# Patient Record
Sex: FEMALE | Race: WHITE | HISPANIC OR LATINO | Employment: UNEMPLOYED | URBAN - METROPOLITAN AREA
[De-identification: names, ages, dates, MRNs, and addresses within clinical notes are randomized per-mention and may not be internally consistent; named-entity substitution may affect disease eponyms.]

---

## 2023-04-17 ENCOUNTER — HOSPITAL ENCOUNTER (OUTPATIENT)
Facility: HOSPITAL | Age: 28
Discharge: HOME | End: 2023-04-17
Attending: OBSTETRICS & GYNECOLOGY | Admitting: OBSTETRICS & GYNECOLOGY
Payer: MEDICAID

## 2023-04-17 VITALS
BODY MASS INDEX: 36.86 KG/M2 | WEIGHT: 208 LBS | HEIGHT: 63 IN | SYSTOLIC BLOOD PRESSURE: 129 MMHG | TEMPERATURE: 98.1 F | DIASTOLIC BLOOD PRESSURE: 86 MMHG | HEART RATE: 70 BPM | RESPIRATION RATE: 16 BRPM

## 2023-04-17 LAB
EXPIRATION DATE: NORMAL
Lab: NORMAL
PROT UR STRIP-MCNC: NEGATIVE MG/DL

## 2023-04-17 PROCEDURE — 59025 FETAL NON-STRESS TEST: CPT

## 2023-04-17 PROCEDURE — G0463 HOSPITAL OUTPT CLINIC VISIT: HCPCS

## 2023-04-17 PROCEDURE — 81002 URINALYSIS NONAUTO W/O SCOPE: CPT | Performed by: OBSTETRICS & GYNECOLOGY

## 2023-04-17 RX ORDER — PRENATAL WITH FERROUS FUM AND FOLIC ACID 3080; 920; 120; 400; 22; 1.84; 3; 20; 10; 1; 12; 200; 27; 25; 2 [IU]/1; [IU]/1; MG/1; [IU]/1; MG/1; MG/1; MG/1; MG/1; MG/1; MG/1; UG/1; MG/1; MG/1; MG/1; MG/1
1 TABLET ORAL DAILY
Status: ON HOLD | COMMUNITY

## 2023-04-17 NOTE — H&P
[facility]  Obstetric History and Physical    Chief Complaint   Patient presents with   • Edema       Subjective     Patient is a 27 y.o. female  currently at 40w1d, who presents with complaints of pedal edema.  Patient recently moved back to this area from [place].  She has [facility] with [facility]  Today, she denies headache, scotomata or epigastric pain.  Her main complaint is of edema and some reddish discoloration to her shins.  She denies calf pain.  She denies contractions, vaginal bleeding or leakage of fluid.    Her prenatal care is reportedly unremarkable. Her previous obstetric/gynecological history is notable for a first trimester pregnancy loss, a term vaginal delivery and a term  section.  She plans a trial of labor with this pregnancy..    The following portions of the patients history were reviewed and updated as appropriate: current medications, allergies, past medical history, past surgical history, past family history, past social history and problem list .        Prenatal Information:   Maternal Prenatal Labs  Blood Type No results found for: ABO   Rh Status No results found for: RH   Antibody Screen No results found for: ABSCRN   Gonnorhea No results found for: GCCX   Chlamydia No results found for: CLAMYDCU   RPR No results found for: RPR   Syphilis Antibody No results found for: SYPHILIS   Rubella No results found for: RUBELLAIGGIN   Hepatitis B Surface Antigen No results found for: HEPBSAG   HIV-1 Antibody No results found for: LABHIV1   Hepatitis C Antibody No results found for: HEPCAB   Rapid Urin Drug Screen No results found for: AMPMETHU, BARBITSCNUR, LABBENZSCN, LABMETHSCN, LABOPIASCN, THCURSCR, COCAINEUR, AMPHETSCREEN, PROPOXSCN, BUPRENORSCNU, METAMPSCNUR, OXYCODONESCN, TRICYCLICSCN   Group B Strep Culture No results found for: GBSANTIGEN                 Past OB History:       OB History    Para Term  AB Living   4 2 2 0  1 2   SAB IAB Ectopic Molar Multiple Live Births   1 0 0 0 0 2      # Outcome Date GA Lbr [place]/2nd Weight Sex Delivery Anes PTL Lv   4 Current            3 Term 17 39w0d   M CS-Unspec EPI  JUAN   2 Term 14 39w0d   M Vag-Spont EPI  JUAN   1 SAB                Past Medical History:  History reviewed. No pertinent past medical history.   Past Surgical History Past Surgical History:   Procedure Laterality Date   •  SECTION        Family History: History reviewed. No pertinent family history.   Social History:  reports that she quit smoking about 12 years ago. Her smoking use included cigarettes. She has never used smokeless tobacco.   reports that she does not currently use alcohol.   reports that she does not currently use drugs.   Allergies: Patient has no known allergies.  Current Medications:          No current facility-administered medications on file prior to encounter.     Current Outpatient Medications on File Prior to Encounter   Medication Sig Dispense Refill   • Prenatal Vit-Fe Fumarate-FA (Prenatal 27-) 27-1 MG tablet tablet Take 1 tablet by mouth Daily.         General ROS: Pertinent items are noted in HPI, all other systems reviewed and negative    Objective       Vital Signs Range for the last 24 hours  Temperature: Temp:  [98.1 °F (36.7 °C)] 98.1 °F (36.7 °C)   Temp Source: Temp src: Axillary   BP: BP: (129)/(86) 129/86   Pulse: Heart Rate:  [70] 70   Respirations: Resp:  [16] 16   SPO2:     O2 Amount (l/min):     O2 Devices     Weight: Weight:  [94.3 kg (208 lb)] 94.3 kg (208 lb)     Physical Examination: General appearance - alert, well appearing, and in no distress, oriented to person, place, and time and overweight  Mental status - alert, oriented to person, place, and time, normal mood, behavior, speech, dress, motor activity, and thought processes  Eyes - pupils equal and reactive, extraocular eye movements intact, sclera anicteric  Neck - supple, no significant adenopathy,  thyroid exam: thyroid is normal in size without nodules or tenderness  Chest - clear to auscultation, no wheezes, rales or rhonchi, symmetric air entry  Heart - normal rate, regular rhythm, normal S1, S2, no murmurs, rubs, clicks or gallops  Abdomen-soft, nontender, nondistended, no masses or organomegaly   Abdomen, Non-Tender  Neurological - alert, oriented, normal speech, no focal findings or movement disorder noted, DTR's normal and symmetric  Extremities - pedal edema 2-3+     Fetal Heart Rate Assessment  Indication: Edema in pregnancy   Start Time: 1136                end Time: 1206   NST Results: Reactive NST.  Fetal heart rate baseline 125-135 bpm.  Average variability with 15 x 15 accelerations noted.  No decelerations.  No regular uterine contraction pattern.        Laboratory Results:   No results found for: ALKPHOS, ALT, AST, CREATININE, BILITOT, LDH, URICACID    No results found for: WBC, RBC, HGB, HCT, MCV, MCH, MCHC, RDW, RDWSD, MPV, PLT, NEUTRORELPCT, LYMPHORELPCT, MONORELPCT, EOSRELPCT, BASORELPCT, AUTOIGPER, NEUTROABS, LYMPHSABS, MONOSABS, EOSABS, BASOSABS, AUTOIGNUM, NRBC          Brief Urine Lab Results  (Last result in the past 365 days)      Color   Clarity   Blood   Leuk Est   Nitrite   Protein   CREAT   Urine HCG        23 1203           Negative                   Radiology Review: No new studies  Other Studies: Urine protein is negative    Assessment & Plan       * No active hospital problems. *        Assessment:  1.  Edema in pregnancy.  No evidence of hypertension or proteinuria.  2.  History of  section.  Patient has had 1 prior vaginal delivery before her  section and plans for trial of labor with this pregnancy.    Plan:  1. Discharge home.  2. Discussed signs/symptoms of labor.  Reviewed instructions for fetal kick counts.  Reviewed signs/symptoms of severe preeclampsia.  3. Discussed relief measures with respect to her edema.  4. Keep regularly scheduled  "OB office visit.     Total time spent today with Angella  was 20-29 minutes (level 3).  Of this time, > 50% was spent face-to-face time coordinating care, answering her questions and counseling regarding pathophysiology of her presenting problem along with plans for any diagnostic work-up and treatment.        Nishant Mao \"Keisha\" JACKIE Bruce MD  4/17/2023  12:04 EDT    "

## 2023-04-22 ENCOUNTER — HOSPITAL ENCOUNTER (INPATIENT)
Facility: HOSPITAL | Age: 28
LOS: 2 days | Discharge: HOME | End: 2023-04-25
Attending: ADVANCED PRACTICE MIDWIFE | Admitting: OBSTETRICS & GYNECOLOGY
Payer: MEDICAID

## 2023-04-22 ENCOUNTER — HOSPITAL ENCOUNTER (OUTPATIENT)
Dept: LABOR AND DELIVERY | Facility: HOSPITAL | Age: 28
Discharge: HOME | End: 2023-04-22
Payer: MEDICAID

## 2023-04-22 DIAGNOSIS — Z98.891 STATUS POST REPEAT LOW TRANSVERSE CESAREAN SECTION: Primary | ICD-10-CM

## 2023-04-22 LAB
ABO GROUP BLD: NORMAL
ALP SERPL-CCNC: 149 U/L (ref 39–117)
ALT SERPL W P-5'-P-CCNC: 8 U/L (ref 1–33)
AST SERPL-CCNC: 18 U/L (ref 1–32)
BILIRUB SERPL-MCNC: 0.7 MG/DL (ref 0–1.2)
BLD GP AB SCN SERPL QL: NEGATIVE
CREAT SERPL-MCNC: 0.47 MG/DL (ref 0.57–1)
DEPRECATED RDW RBC AUTO: 44.5 FL (ref 37–54)
ERYTHROCYTE [DISTWIDTH] IN BLOOD BY AUTOMATED COUNT: 13.5 % (ref 12.3–15.4)
HCT VFR BLD AUTO: 38.1 % (ref 34–46.6)
HGB BLD-MCNC: 12.6 G/DL (ref 12–15.9)
LDH SERPL-CCNC: 261 U/L (ref 135–214)
MCH RBC QN AUTO: 30.1 PG (ref 26.6–33)
MCHC RBC AUTO-ENTMCNC: 33.1 G/DL (ref 31.5–35.7)
MCV RBC AUTO: 91.1 FL (ref 79–97)
PLATELET # BLD AUTO: 237 10*3/MM3 (ref 140–450)
PMV BLD AUTO: 9.8 FL (ref 6–12)
RBC # BLD AUTO: 4.18 10*6/MM3 (ref 3.77–5.28)
RH BLD: POSITIVE
T&S EXPIRATION DATE: NORMAL
URATE SERPL-MCNC: 4.5 MG/DL (ref 2.4–5.7)
WBC NRBC COR # BLD: 9.68 10*3/MM3 (ref 3.4–10.8)

## 2023-04-22 PROCEDURE — 83615 LACTATE (LD) (LDH) ENZYME: CPT | Performed by: ADVANCED PRACTICE MIDWIFE

## 2023-04-22 PROCEDURE — 84075 ASSAY ALKALINE PHOSPHATASE: CPT | Performed by: ADVANCED PRACTICE MIDWIFE

## 2023-04-22 PROCEDURE — 84450 TRANSFERASE (AST) (SGOT): CPT | Performed by: ADVANCED PRACTICE MIDWIFE

## 2023-04-22 PROCEDURE — 36415 COLL VENOUS BLD VENIPUNCTURE: CPT | Performed by: ADVANCED PRACTICE MIDWIFE

## 2023-04-22 PROCEDURE — 82565 ASSAY OF CREATININE: CPT | Performed by: ADVANCED PRACTICE MIDWIFE

## 2023-04-22 PROCEDURE — 86900 BLOOD TYPING SEROLOGIC ABO: CPT

## 2023-04-22 PROCEDURE — 82247 BILIRUBIN TOTAL: CPT | Performed by: ADVANCED PRACTICE MIDWIFE

## 2023-04-22 PROCEDURE — 86901 BLOOD TYPING SEROLOGIC RH(D): CPT

## 2023-04-22 PROCEDURE — 86850 RBC ANTIBODY SCREEN: CPT | Performed by: ADVANCED PRACTICE MIDWIFE

## 2023-04-22 PROCEDURE — 86901 BLOOD TYPING SEROLOGIC RH(D): CPT | Performed by: ADVANCED PRACTICE MIDWIFE

## 2023-04-22 PROCEDURE — 84460 ALANINE AMINO (ALT) (SGPT): CPT | Performed by: ADVANCED PRACTICE MIDWIFE

## 2023-04-22 PROCEDURE — 86900 BLOOD TYPING SEROLOGIC ABO: CPT | Performed by: ADVANCED PRACTICE MIDWIFE

## 2023-04-22 PROCEDURE — 85027 COMPLETE CBC AUTOMATED: CPT | Performed by: ADVANCED PRACTICE MIDWIFE

## 2023-04-22 PROCEDURE — 84550 ASSAY OF BLOOD/URIC ACID: CPT | Performed by: ADVANCED PRACTICE MIDWIFE

## 2023-04-22 RX ORDER — OXYTOCIN 10 [USP'U]/ML
10 INJECTION, SOLUTION INTRAMUSCULAR; INTRAVENOUS ONCE
Status: CANCELLED | OUTPATIENT
Start: 2023-04-22 | End: 2023-04-22

## 2023-04-22 RX ORDER — FAMOTIDINE 10 MG/ML
20 INJECTION, SOLUTION INTRAVENOUS EVERY 12 HOURS PRN
Status: DISCONTINUED | OUTPATIENT
Start: 2023-04-22 | End: 2023-04-23

## 2023-04-22 RX ORDER — SODIUM CHLORIDE, SODIUM LACTATE, POTASSIUM CHLORIDE, CALCIUM CHLORIDE 600; 310; 30; 20 MG/100ML; MG/100ML; MG/100ML; MG/100ML
125 INJECTION, SOLUTION INTRAVENOUS CONTINUOUS
Status: DISCONTINUED | OUTPATIENT
Start: 2023-04-22 | End: 2023-04-23

## 2023-04-22 RX ORDER — DIPHENHYDRAMINE HYDROCHLORIDE 50 MG/ML
25 INJECTION INTRAMUSCULAR; INTRAVENOUS NIGHTLY PRN
Status: DISCONTINUED | OUTPATIENT
Start: 2023-04-22 | End: 2023-04-23

## 2023-04-22 RX ORDER — SODIUM CHLORIDE 0.9 % (FLUSH) 0.9 %
10 SYRINGE (ML) INJECTION EVERY 12 HOURS SCHEDULED
Status: CANCELLED | OUTPATIENT
Start: 2023-04-22

## 2023-04-22 RX ORDER — METHYLERGONOVINE MALEATE 0.2 MG/ML
200 INJECTION INTRAVENOUS ONCE AS NEEDED
Status: CANCELLED | OUTPATIENT
Start: 2023-04-22

## 2023-04-22 RX ORDER — DIPHENHYDRAMINE HCL 25 MG
25 CAPSULE ORAL NIGHTLY PRN
Status: DISCONTINUED | OUTPATIENT
Start: 2023-04-22 | End: 2023-04-23

## 2023-04-22 RX ORDER — SODIUM CHLORIDE 0.9 % (FLUSH) 0.9 %
10 SYRINGE (ML) INJECTION AS NEEDED
Status: DISCONTINUED | OUTPATIENT
Start: 2023-04-22 | End: 2023-04-23

## 2023-04-22 RX ORDER — BUTORPHANOL TARTRATE 1 MG/ML
2 INJECTION, SOLUTION INTRAMUSCULAR; INTRAVENOUS
Status: DISCONTINUED | OUTPATIENT
Start: 2023-04-22 | End: 2023-04-23

## 2023-04-22 RX ORDER — BUTORPHANOL TARTRATE 1 MG/ML
1 INJECTION, SOLUTION INTRAMUSCULAR; INTRAVENOUS
Status: CANCELLED | OUTPATIENT
Start: 2023-04-22

## 2023-04-22 RX ORDER — DIPHENHYDRAMINE HYDROCHLORIDE 50 MG/ML
25 INJECTION INTRAMUSCULAR; INTRAVENOUS NIGHTLY PRN
Status: CANCELLED | OUTPATIENT
Start: 2023-04-22

## 2023-04-22 RX ORDER — FAMOTIDINE 20 MG/1
20 TABLET, FILM COATED ORAL EVERY 12 HOURS PRN
Status: DISCONTINUED | OUTPATIENT
Start: 2023-04-22 | End: 2023-04-23

## 2023-04-22 RX ORDER — BUTORPHANOL TARTRATE 1 MG/ML
1 INJECTION, SOLUTION INTRAMUSCULAR; INTRAVENOUS
Status: DISCONTINUED | OUTPATIENT
Start: 2023-04-22 | End: 2023-04-23

## 2023-04-22 RX ORDER — TERBUTALINE SULFATE 1 MG/ML
0.25 INJECTION, SOLUTION SUBCUTANEOUS AS NEEDED
Status: CANCELLED | OUTPATIENT
Start: 2023-04-22

## 2023-04-22 RX ORDER — MAGNESIUM CARB/ALUMINUM HYDROX 105-160MG
30 TABLET,CHEWABLE ORAL ONCE
Status: CANCELLED | OUTPATIENT
Start: 2023-04-22 | End: 2023-04-22

## 2023-04-22 RX ORDER — OXYTOCIN/0.9 % SODIUM CHLORIDE 30/500 ML
2-20 PLASTIC BAG, INJECTION (ML) INTRAVENOUS
Status: CANCELLED | OUTPATIENT
Start: 2023-04-23

## 2023-04-22 RX ORDER — ACETAMINOPHEN 325 MG/1
650 TABLET ORAL EVERY 4 HOURS PRN
Status: DISCONTINUED | OUTPATIENT
Start: 2023-04-22 | End: 2023-04-23 | Stop reason: HOSPADM

## 2023-04-22 RX ORDER — SODIUM CHLORIDE 0.9 % (FLUSH) 0.9 %
10 SYRINGE (ML) INJECTION EVERY 12 HOURS SCHEDULED
Status: DISCONTINUED | OUTPATIENT
Start: 2023-04-22 | End: 2023-04-23

## 2023-04-22 RX ORDER — ONDANSETRON 4 MG/1
4 TABLET, FILM COATED ORAL EVERY 6 HOURS PRN
Status: DISCONTINUED | OUTPATIENT
Start: 2023-04-22 | End: 2023-04-23 | Stop reason: HOSPADM

## 2023-04-22 RX ORDER — SODIUM CHLORIDE 0.9 % (FLUSH) 0.9 %
10 SYRINGE (ML) INJECTION AS NEEDED
Status: CANCELLED | OUTPATIENT
Start: 2023-04-22

## 2023-04-22 RX ORDER — OXYTOCIN/0.9 % SODIUM CHLORIDE 30/500 ML
2-20 PLASTIC BAG, INJECTION (ML) INTRAVENOUS
Status: DISCONTINUED | OUTPATIENT
Start: 2023-04-23 | End: 2023-04-23

## 2023-04-22 RX ORDER — CARBOPROST TROMETHAMINE 250 UG/ML
250 INJECTION, SOLUTION INTRAMUSCULAR AS NEEDED
Status: CANCELLED | OUTPATIENT
Start: 2023-04-22

## 2023-04-22 RX ORDER — LIDOCAINE HYDROCHLORIDE 10 MG/ML
5 INJECTION, SOLUTION EPIDURAL; INFILTRATION; INTRACAUDAL; PERINEURAL AS NEEDED
Status: DISCONTINUED | OUTPATIENT
Start: 2023-04-22 | End: 2023-04-23

## 2023-04-22 RX ORDER — ONDANSETRON 4 MG/1
4 TABLET, FILM COATED ORAL EVERY 6 HOURS PRN
Status: CANCELLED | OUTPATIENT
Start: 2023-04-22

## 2023-04-22 RX ORDER — MISOPROSTOL 200 UG/1
800 TABLET ORAL AS NEEDED
Status: CANCELLED | OUTPATIENT
Start: 2023-04-22

## 2023-04-22 RX ORDER — TERBUTALINE SULFATE 1 MG/ML
0.25 INJECTION, SOLUTION SUBCUTANEOUS AS NEEDED
Status: DISCONTINUED | OUTPATIENT
Start: 2023-04-22 | End: 2023-04-23

## 2023-04-22 RX ORDER — LIDOCAINE HYDROCHLORIDE 10 MG/ML
5 INJECTION, SOLUTION EPIDURAL; INFILTRATION; INTRACAUDAL; PERINEURAL AS NEEDED
Status: CANCELLED | OUTPATIENT
Start: 2023-04-22

## 2023-04-22 RX ORDER — DIPHENHYDRAMINE HCL 25 MG
25 CAPSULE ORAL NIGHTLY PRN
Status: CANCELLED | OUTPATIENT
Start: 2023-04-22

## 2023-04-22 RX ORDER — IBUPROFEN 600 MG/1
600 TABLET ORAL EVERY 6 HOURS PRN
Status: CANCELLED | OUTPATIENT
Start: 2023-04-22

## 2023-04-22 RX ORDER — SODIUM CHLORIDE, SODIUM LACTATE, POTASSIUM CHLORIDE, CALCIUM CHLORIDE 600; 310; 30; 20 MG/100ML; MG/100ML; MG/100ML; MG/100ML
125 INJECTION, SOLUTION INTRAVENOUS CONTINUOUS
Status: CANCELLED | OUTPATIENT
Start: 2023-04-22

## 2023-04-22 RX ORDER — ACETAMINOPHEN 325 MG/1
650 TABLET ORAL EVERY 4 HOURS PRN
Status: CANCELLED | OUTPATIENT
Start: 2023-04-22

## 2023-04-22 RX ORDER — ONDANSETRON 2 MG/ML
4 INJECTION INTRAMUSCULAR; INTRAVENOUS EVERY 6 HOURS PRN
Status: CANCELLED | OUTPATIENT
Start: 2023-04-22

## 2023-04-22 RX ORDER — FAMOTIDINE 20 MG/1
20 TABLET, FILM COATED ORAL EVERY 12 HOURS PRN
Status: CANCELLED | OUTPATIENT
Start: 2023-04-22

## 2023-04-22 RX ORDER — ONDANSETRON 2 MG/ML
4 INJECTION INTRAMUSCULAR; INTRAVENOUS EVERY 6 HOURS PRN
Status: DISCONTINUED | OUTPATIENT
Start: 2023-04-22 | End: 2023-04-23 | Stop reason: HOSPADM

## 2023-04-22 RX ORDER — MAGNESIUM CARB/ALUMINUM HYDROX 105-160MG
30 TABLET,CHEWABLE ORAL ONCE
Status: DISCONTINUED | OUTPATIENT
Start: 2023-04-22 | End: 2023-04-23

## 2023-04-23 ENCOUNTER — ANESTHESIA EVENT (OUTPATIENT)
Dept: LABOR AND DELIVERY | Facility: HOSPITAL | Age: 28
End: 2023-04-23
Payer: MEDICAID

## 2023-04-23 ENCOUNTER — ANESTHESIA (OUTPATIENT)
Dept: LABOR AND DELIVERY | Facility: HOSPITAL | Age: 28
End: 2023-04-23
Payer: MEDICAID

## 2023-04-23 PROBLEM — Z3A.41 POST TERM PREGNANCY AT 41 WEEKS GESTATION: Status: ACTIVE | Noted: 2023-04-23

## 2023-04-23 PROBLEM — O48.0 POST TERM PREGNANCY AT 41 WEEKS GESTATION: Status: ACTIVE | Noted: 2023-04-23

## 2023-04-23 LAB
ABO GROUP BLD: NORMAL
ATMOSPHERIC PRESS: ABNORMAL MM[HG]
ATMOSPHERIC PRESS: ABNORMAL MM[HG]
BASE EXCESS BLDCOA CALC-SCNC: -2.8 MMOL/L (ref 0–2)
BASE EXCESS BLDCOV CALC-SCNC: -1.4 MMOL/L (ref 0–2)
BDY SITE: ABNORMAL
BDY SITE: ABNORMAL
BODY TEMPERATURE: 37 C
BODY TEMPERATURE: 37 C
CO2 BLDA-SCNC: 25.8 MMOL/L (ref 22–33)
CO2 BLDA-SCNC: 26.7 MMOL/L (ref 22–33)
COLLECT TME SMN: ABNORMAL
EPAP: 0
EPAP: 0
HCO3 BLDCOA-SCNC: 25 MMOL/L (ref 16.9–20.5)
HCO3 BLDCOV-SCNC: 24.5 MMOL/L (ref 18.6–21.4)
HCT VFR BLD AUTO: 39.1 % (ref 34–46.6)
HGB BLD-MCNC: 13.3 G/DL (ref 12–15.9)
HGB BLDA-MCNC: 17.4 G/DL (ref 14–18)
HGB BLDA-MCNC: 17.8 G/DL (ref 14–18)
INHALED O2 CONCENTRATION: 21 %
INHALED O2 CONCENTRATION: 21 %
IPAP: 0
IPAP: 0
MODALITY: ABNORMAL
MODALITY: ABNORMAL
NOTE: ABNORMAL
NOTE: ABNORMAL
PAW @ PEAK INSP FLOW SETTING VENT: 0 CMH2O
PAW @ PEAK INSP FLOW SETTING VENT: 0 CMH2O
PCO2 BLDCOA: 53.3 MMHG (ref 43.3–54.9)
PCO2 BLDCOV: 43.9 MM HG (ref 28–40)
PH BLDCOA: 7.28 PH UNITS (ref 7.22–7.3)
PH BLDCOV: 7.36 PH UNITS (ref 7.31–7.37)
PO2 BLDCOA: 15.1 MMHG (ref 11.5–43.3)
PO2 BLDCOV: 21.7 MM HG (ref 21–31)
RH BLD: POSITIVE
SAO2 % BLDCOA: 23.3 %
SAO2 % BLDCOA: ABNORMAL %
SAO2 % BLDCOV: 47.5 %
TOTAL RATE: 0 BREATHS/MINUTE
TOTAL RATE: 0 BREATHS/MINUTE
VENTILATOR MODE: ABNORMAL

## 2023-04-23 PROCEDURE — 25010000002 CHLOROPROCAINE HCL (PF) 3 % SOLUTION: Performed by: ANESTHESIOLOGY

## 2023-04-23 PROCEDURE — 85018 HEMOGLOBIN: CPT | Performed by: OBSTETRICS & GYNECOLOGY

## 2023-04-23 PROCEDURE — 25010000002 BUTORPHANOL PER 1 MG: Performed by: ADVANCED PRACTICE MIDWIFE

## 2023-04-23 PROCEDURE — 25010000002 METHYLERGONOVINE MALEATE PER 0.2 MG: Performed by: ANESTHESIOLOGY

## 2023-04-23 PROCEDURE — 25010000002 FUROSEMIDE PER 20 MG: Performed by: OBSTETRICS & GYNECOLOGY

## 2023-04-23 PROCEDURE — 25010000002 ONDANSETRON PER 1 MG: Performed by: OBSTETRICS & GYNECOLOGY

## 2023-04-23 PROCEDURE — 10907ZC DRAINAGE OF AMNIOTIC FLUID, THERAPEUTIC FROM PRODUCTS OF CONCEPTION, VIA NATURAL OR ARTIFICIAL OPENING: ICD-10-PCS | Performed by: OBSTETRICS & GYNECOLOGY

## 2023-04-23 PROCEDURE — 25010000002 OXYTOCIN PER 10 UNITS: Performed by: ANESTHESIOLOGY

## 2023-04-23 PROCEDURE — 82805 BLOOD GASES W/O2 SATURATION: CPT

## 2023-04-23 PROCEDURE — 85014 HEMATOCRIT: CPT | Performed by: OBSTETRICS & GYNECOLOGY

## 2023-04-23 PROCEDURE — 25010000002 ROPIVACAINE PER 1 MG: Performed by: ANESTHESIOLOGY

## 2023-04-23 PROCEDURE — 59025 FETAL NON-STRESS TEST: CPT

## 2023-04-23 PROCEDURE — 3E033VJ INTRODUCTION OF OTHER HORMONE INTO PERIPHERAL VEIN, PERCUTANEOUS APPROACH: ICD-10-PCS | Performed by: OBSTETRICS & GYNECOLOGY

## 2023-04-23 PROCEDURE — 25010000002 MIDAZOLAM PER 1 MG: Performed by: ANESTHESIOLOGY

## 2023-04-23 PROCEDURE — C1755 CATHETER, INTRASPINAL: HCPCS | Performed by: ANESTHESIOLOGY

## 2023-04-23 PROCEDURE — C1755 CATHETER, INTRASPINAL: HCPCS

## 2023-04-23 PROCEDURE — 25010000002 METOCLOPRAMIDE PER 10 MG: Performed by: ANESTHESIOLOGY

## 2023-04-23 PROCEDURE — 25010000002 AZITHROMYCIN PER 500 MG: Performed by: OBSTETRICS & GYNECOLOGY

## 2023-04-23 PROCEDURE — 51703 INSERT BLADDER CATH COMPLEX: CPT

## 2023-04-23 PROCEDURE — 25010000002 ONDANSETRON PER 1 MG: Performed by: ANESTHESIOLOGY

## 2023-04-23 PROCEDURE — 25010000002 KETOROLAC TROMETHAMINE PER 15 MG: Performed by: OBSTETRICS & GYNECOLOGY

## 2023-04-23 PROCEDURE — 0 MORPHINE PER 10 MG: Performed by: ANESTHESIOLOGY

## 2023-04-23 PROCEDURE — 25010000002 FENTANYL CITRATE (PF) 50 MCG/ML SOLUTION: Performed by: ANESTHESIOLOGY

## 2023-04-23 RX ORDER — CEFAZOLIN SODIUM 2 G/100ML
2 INJECTION, SOLUTION INTRAVENOUS ONCE
Status: DISCONTINUED | OUTPATIENT
Start: 2023-04-23 | End: 2023-04-25 | Stop reason: HOSPADM

## 2023-04-23 RX ORDER — KETOROLAC TROMETHAMINE 15 MG/ML
15 INJECTION, SOLUTION INTRAMUSCULAR; INTRAVENOUS EVERY 6 HOURS
Status: COMPLETED | OUTPATIENT
Start: 2023-04-24 | End: 2023-04-24

## 2023-04-23 RX ORDER — ROPIVACAINE HYDROCHLORIDE 5 MG/ML
INJECTION, SOLUTION EPIDURAL; INFILTRATION; PERINEURAL AS NEEDED
Status: DISCONTINUED | OUTPATIENT
Start: 2023-04-23 | End: 2023-04-23 | Stop reason: SURG

## 2023-04-23 RX ORDER — MISOPROSTOL 200 UG/1
800 TABLET ORAL AS NEEDED
Status: DISCONTINUED | OUTPATIENT
Start: 2023-04-23 | End: 2023-04-23 | Stop reason: HOSPADM

## 2023-04-23 RX ORDER — ALUMINA, MAGNESIA, AND SIMETHICONE 2400; 2400; 240 MG/30ML; MG/30ML; MG/30ML
15 SUSPENSION ORAL EVERY 4 HOURS PRN
Status: DISCONTINUED | OUTPATIENT
Start: 2023-04-23 | End: 2023-04-25 | Stop reason: HOSPADM

## 2023-04-23 RX ORDER — ONDANSETRON 2 MG/ML
4 INJECTION INTRAMUSCULAR; INTRAVENOUS ONCE AS NEEDED
Status: DISCONTINUED | OUTPATIENT
Start: 2023-04-23 | End: 2023-04-23

## 2023-04-23 RX ORDER — METOCLOPRAMIDE HYDROCHLORIDE 5 MG/ML
INJECTION INTRAMUSCULAR; INTRAVENOUS AS NEEDED
Status: DISCONTINUED | OUTPATIENT
Start: 2023-04-23 | End: 2023-04-23 | Stop reason: SURG

## 2023-04-23 RX ORDER — DIPHENHYDRAMINE HYDROCHLORIDE 50 MG/ML
25 INJECTION INTRAMUSCULAR; INTRAVENOUS ONCE
Status: DISCONTINUED | OUTPATIENT
Start: 2023-04-23 | End: 2023-04-23

## 2023-04-23 RX ORDER — DIPHENHYDRAMINE HCL 25 MG
25 CAPSULE ORAL EVERY 4 HOURS PRN
Status: DISCONTINUED | OUTPATIENT
Start: 2023-04-23 | End: 2023-04-25 | Stop reason: HOSPADM

## 2023-04-23 RX ORDER — ONDANSETRON 2 MG/ML
4 INJECTION INTRAMUSCULAR; INTRAVENOUS ONCE AS NEEDED
Status: ACTIVE | OUTPATIENT
Start: 2023-04-23 | End: 2023-04-24

## 2023-04-23 RX ORDER — PROMETHAZINE HYDROCHLORIDE 12.5 MG/1
12.5 TABLET ORAL EVERY 4 HOURS PRN
Status: DISCONTINUED | OUTPATIENT
Start: 2023-04-23 | End: 2023-04-23 | Stop reason: SDUPTHER

## 2023-04-23 RX ORDER — OXYCODONE HYDROCHLORIDE 5 MG/1
5 TABLET ORAL EVERY 4 HOURS PRN
Status: DISCONTINUED | OUTPATIENT
Start: 2023-04-23 | End: 2023-04-25 | Stop reason: HOSPADM

## 2023-04-23 RX ORDER — FAMOTIDINE 10 MG/ML
20 INJECTION, SOLUTION INTRAVENOUS ONCE AS NEEDED
Status: DISCONTINUED | OUTPATIENT
Start: 2023-04-23 | End: 2023-04-23 | Stop reason: HOSPADM

## 2023-04-23 RX ORDER — ROPIVACAINE HYDROCHLORIDE 2 MG/ML
15 INJECTION, SOLUTION EPIDURAL; INFILTRATION; PERINEURAL CONTINUOUS
Status: DISCONTINUED | OUTPATIENT
Start: 2023-04-23 | End: 2023-04-23

## 2023-04-23 RX ORDER — MIDAZOLAM HYDROCHLORIDE 1 MG/ML
INJECTION INTRAMUSCULAR; INTRAVENOUS AS NEEDED
Status: DISCONTINUED | OUTPATIENT
Start: 2023-04-23 | End: 2023-04-23 | Stop reason: SURG

## 2023-04-23 RX ORDER — KETOROLAC TROMETHAMINE 30 MG/ML
30 INJECTION, SOLUTION INTRAMUSCULAR; INTRAVENOUS ONCE
Status: CANCELLED | OUTPATIENT
Start: 2023-04-23 | End: 2023-04-28

## 2023-04-23 RX ORDER — MISOPROSTOL 200 UG/1
800 TABLET ORAL AS NEEDED
Status: DISCONTINUED | OUTPATIENT
Start: 2023-04-23 | End: 2023-04-25 | Stop reason: HOSPADM

## 2023-04-23 RX ORDER — IBUPROFEN 600 MG/1
600 TABLET ORAL EVERY 6 HOURS PRN
Status: DISCONTINUED | OUTPATIENT
Start: 2023-04-23 | End: 2023-04-23 | Stop reason: HOSPADM

## 2023-04-23 RX ORDER — DIPHENHYDRAMINE HYDROCHLORIDE 50 MG/ML
25 INJECTION INTRAMUSCULAR; INTRAVENOUS ONCE AS NEEDED
Status: DISCONTINUED | OUTPATIENT
Start: 2023-04-23 | End: 2023-04-25 | Stop reason: HOSPADM

## 2023-04-23 RX ORDER — METHYLERGONOVINE MALEATE 0.2 MG/ML
INJECTION INTRAVENOUS AS NEEDED
Status: DISCONTINUED | OUTPATIENT
Start: 2023-04-23 | End: 2023-04-23 | Stop reason: SURG

## 2023-04-23 RX ORDER — OXYTOCIN 10 [USP'U]/ML
INJECTION, SOLUTION INTRAMUSCULAR; INTRAVENOUS AS NEEDED
Status: DISCONTINUED | OUTPATIENT
Start: 2023-04-23 | End: 2023-04-23 | Stop reason: SURG

## 2023-04-23 RX ORDER — DIPHENHYDRAMINE HYDROCHLORIDE 50 MG/ML
25 INJECTION INTRAMUSCULAR; INTRAVENOUS EVERY 4 HOURS PRN
Status: DISCONTINUED | OUTPATIENT
Start: 2023-04-23 | End: 2023-04-25 | Stop reason: HOSPADM

## 2023-04-23 RX ORDER — HYDROXYZINE HYDROCHLORIDE 25 MG/1
50 TABLET, FILM COATED ORAL EVERY 6 HOURS PRN
Status: DISCONTINUED | OUTPATIENT
Start: 2023-04-23 | End: 2023-04-25 | Stop reason: HOSPADM

## 2023-04-23 RX ORDER — HYDROCORTISONE 25 MG/G
1 CREAM TOPICAL AS NEEDED
Status: DISCONTINUED | OUTPATIENT
Start: 2023-04-23 | End: 2023-04-25 | Stop reason: HOSPADM

## 2023-04-23 RX ORDER — IBUPROFEN 600 MG/1
600 TABLET ORAL EVERY 6 HOURS
Status: DISCONTINUED | OUTPATIENT
Start: 2023-04-25 | End: 2023-04-25 | Stop reason: HOSPADM

## 2023-04-23 RX ORDER — FUROSEMIDE 10 MG/ML
40 INJECTION INTRAMUSCULAR; INTRAVENOUS ONCE
Status: COMPLETED | OUTPATIENT
Start: 2023-04-23 | End: 2023-04-23

## 2023-04-23 RX ORDER — OXYCODONE HYDROCHLORIDE 5 MG/1
10 TABLET ORAL EVERY 4 HOURS PRN
Status: DISCONTINUED | OUTPATIENT
Start: 2023-04-23 | End: 2023-04-25 | Stop reason: HOSPADM

## 2023-04-23 RX ORDER — PROMETHAZINE HYDROCHLORIDE 12.5 MG/1
12.5 SUPPOSITORY RECTAL EVERY 6 HOURS PRN
Status: DISCONTINUED | OUTPATIENT
Start: 2023-04-23 | End: 2023-04-25 | Stop reason: HOSPADM

## 2023-04-23 RX ORDER — TRISODIUM CITRATE DIHYDRATE AND CITRIC ACID MONOHYDRATE 500; 334 MG/5ML; MG/5ML
30 SOLUTION ORAL ONCE
Status: DISCONTINUED | OUTPATIENT
Start: 2023-04-23 | End: 2023-04-23 | Stop reason: HOSPADM

## 2023-04-23 RX ORDER — MORPHINE SULFATE 0.5 MG/ML
INJECTION, SOLUTION EPIDURAL; INTRATHECAL; INTRAVENOUS AS NEEDED
Status: DISCONTINUED | OUTPATIENT
Start: 2023-04-23 | End: 2023-04-23 | Stop reason: SURG

## 2023-04-23 RX ORDER — METOCLOPRAMIDE HYDROCHLORIDE 5 MG/ML
10 INJECTION INTRAMUSCULAR; INTRAVENOUS ONCE AS NEEDED
Status: DISCONTINUED | OUTPATIENT
Start: 2023-04-23 | End: 2023-04-23

## 2023-04-23 RX ORDER — DIPHENHYDRAMINE HYDROCHLORIDE 50 MG/ML
12.5 INJECTION INTRAMUSCULAR; INTRAVENOUS EVERY 8 HOURS PRN
Status: DISCONTINUED | OUTPATIENT
Start: 2023-04-23 | End: 2023-04-23

## 2023-04-23 RX ORDER — OXYTOCIN 10 [USP'U]/ML
10 INJECTION, SOLUTION INTRAMUSCULAR; INTRAVENOUS ONCE
Status: DISCONTINUED | OUTPATIENT
Start: 2023-04-23 | End: 2023-04-23

## 2023-04-23 RX ORDER — METHYLERGONOVINE MALEATE 0.2 MG/ML
200 INJECTION INTRAVENOUS AS NEEDED
Status: DISCONTINUED | OUTPATIENT
Start: 2023-04-23 | End: 2023-04-25 | Stop reason: HOSPADM

## 2023-04-23 RX ORDER — PRENATAL VIT/IRON FUM/FOLIC AC 27MG-0.8MG
1 TABLET ORAL DAILY
Status: DISCONTINUED | OUTPATIENT
Start: 2023-04-24 | End: 2023-04-25 | Stop reason: HOSPADM

## 2023-04-23 RX ORDER — SODIUM CHLORIDE 9 MG/ML
40 INJECTION, SOLUTION INTRAVENOUS AS NEEDED
Status: DISCONTINUED | OUTPATIENT
Start: 2023-04-23 | End: 2023-04-25 | Stop reason: HOSPADM

## 2023-04-23 RX ORDER — PROMETHAZINE HYDROCHLORIDE 25 MG/1
25 TABLET ORAL EVERY 6 HOURS PRN
Status: DISCONTINUED | OUTPATIENT
Start: 2023-04-23 | End: 2023-04-25 | Stop reason: HOSPADM

## 2023-04-23 RX ORDER — LIDOCAINE HYDROCHLORIDE AND EPINEPHRINE 15; 5 MG/ML; UG/ML
INJECTION, SOLUTION EPIDURAL AS NEEDED
Status: DISCONTINUED | OUTPATIENT
Start: 2023-04-23 | End: 2023-04-23 | Stop reason: SURG

## 2023-04-23 RX ORDER — CARBOPROST TROMETHAMINE 250 UG/ML
250 INJECTION, SOLUTION INTRAMUSCULAR AS NEEDED
Status: DISCONTINUED | OUTPATIENT
Start: 2023-04-23 | End: 2023-04-25 | Stop reason: HOSPADM

## 2023-04-23 RX ORDER — NALOXONE HCL 0.4 MG/ML
0.4 VIAL (ML) INJECTION
Status: ACTIVE | OUTPATIENT
Start: 2023-04-23 | End: 2023-04-24

## 2023-04-23 RX ORDER — METHYLERGONOVINE MALEATE 0.2 MG/ML
200 INJECTION INTRAVENOUS ONCE AS NEEDED
Status: DISCONTINUED | OUTPATIENT
Start: 2023-04-23 | End: 2023-04-23 | Stop reason: HOSPADM

## 2023-04-23 RX ORDER — ONDANSETRON 2 MG/ML
INJECTION INTRAMUSCULAR; INTRAVENOUS AS NEEDED
Status: DISCONTINUED | OUTPATIENT
Start: 2023-04-23 | End: 2023-04-23 | Stop reason: SURG

## 2023-04-23 RX ORDER — DOCUSATE SODIUM 100 MG/1
100 CAPSULE, LIQUID FILLED ORAL 2 TIMES DAILY PRN
Status: DISCONTINUED | OUTPATIENT
Start: 2023-04-23 | End: 2023-04-25 | Stop reason: HOSPADM

## 2023-04-23 RX ORDER — SODIUM CHLORIDE 0.9 % (FLUSH) 0.9 %
3 SYRINGE (ML) INJECTION EVERY 12 HOURS SCHEDULED
Status: DISCONTINUED | OUTPATIENT
Start: 2023-04-23 | End: 2023-04-25 | Stop reason: HOSPADM

## 2023-04-23 RX ORDER — OXYTOCIN/0.9 % SODIUM CHLORIDE 30/500 ML
PLASTIC BAG, INJECTION (ML) INTRAVENOUS AS NEEDED
Status: DISCONTINUED | OUTPATIENT
Start: 2023-04-23 | End: 2023-04-23 | Stop reason: SURG

## 2023-04-23 RX ORDER — CEFAZOLIN SODIUM 2 G/100ML
INJECTION, SOLUTION INTRAVENOUS
Status: DISCONTINUED
Start: 2023-04-23 | End: 2023-04-23 | Stop reason: WASHOUT

## 2023-04-23 RX ORDER — CHLOROPROCAINE HYDROCHLORIDE 30 MG/ML
INJECTION, SOLUTION EPIDURAL; INFILTRATION; INTRACAUDAL; PERINEURAL AS NEEDED
Status: DISCONTINUED | OUTPATIENT
Start: 2023-04-23 | End: 2023-04-23 | Stop reason: SURG

## 2023-04-23 RX ORDER — CARBOPROST TROMETHAMINE 250 UG/ML
250 INJECTION, SOLUTION INTRAMUSCULAR AS NEEDED
Status: DISCONTINUED | OUTPATIENT
Start: 2023-04-23 | End: 2023-04-23 | Stop reason: HOSPADM

## 2023-04-23 RX ORDER — EPHEDRINE SULFATE 5 MG/ML
10 INJECTION INTRAVENOUS
Status: DISCONTINUED | OUTPATIENT
Start: 2023-04-23 | End: 2023-04-23

## 2023-04-23 RX ORDER — OXYTOCIN/0.9 % SODIUM CHLORIDE 30/500 ML
650 PLASTIC BAG, INJECTION (ML) INTRAVENOUS ONCE
Status: CANCELLED | OUTPATIENT
Start: 2023-04-23 | End: 2023-04-23

## 2023-04-23 RX ORDER — LIDOCAINE HYDROCHLORIDE AND EPINEPHRINE 20; 5 MG/ML; UG/ML
INJECTION, SOLUTION EPIDURAL; INFILTRATION; INTRACAUDAL; PERINEURAL AS NEEDED
Status: DISCONTINUED | OUTPATIENT
Start: 2023-04-23 | End: 2023-04-23 | Stop reason: SURG

## 2023-04-23 RX ORDER — ONDANSETRON 2 MG/ML
4 INJECTION INTRAMUSCULAR; INTRAVENOUS EVERY 6 HOURS PRN
Status: DISCONTINUED | OUTPATIENT
Start: 2023-04-23 | End: 2023-04-25 | Stop reason: HOSPADM

## 2023-04-23 RX ORDER — ACETAMINOPHEN 325 MG/1
650 TABLET ORAL EVERY 6 HOURS
Status: DISCONTINUED | OUTPATIENT
Start: 2023-04-24 | End: 2023-04-25 | Stop reason: HOSPADM

## 2023-04-23 RX ORDER — CALCIUM CARBONATE 200(500)MG
1 TABLET,CHEWABLE ORAL EVERY 4 HOURS PRN
Status: DISCONTINUED | OUTPATIENT
Start: 2023-04-23 | End: 2023-04-25 | Stop reason: HOSPADM

## 2023-04-23 RX ORDER — FENTANYL CITRATE 50 UG/ML
INJECTION, SOLUTION INTRAMUSCULAR; INTRAVENOUS AS NEEDED
Status: DISCONTINUED | OUTPATIENT
Start: 2023-04-23 | End: 2023-04-23 | Stop reason: SURG

## 2023-04-23 RX ORDER — ONDANSETRON 4 MG/1
4 TABLET, FILM COATED ORAL EVERY 6 HOURS PRN
Status: DISCONTINUED | OUTPATIENT
Start: 2023-04-23 | End: 2023-04-25 | Stop reason: HOSPADM

## 2023-04-23 RX ORDER — SODIUM CHLORIDE 0.9 % (FLUSH) 0.9 %
3-10 SYRINGE (ML) INJECTION AS NEEDED
Status: DISCONTINUED | OUTPATIENT
Start: 2023-04-23 | End: 2023-04-25 | Stop reason: HOSPADM

## 2023-04-23 RX ORDER — SIMETHICONE 80 MG
80 TABLET,CHEWABLE ORAL 4 TIMES DAILY PRN
Status: DISCONTINUED | OUTPATIENT
Start: 2023-04-23 | End: 2023-04-25 | Stop reason: HOSPADM

## 2023-04-23 RX ORDER — KETOROLAC TROMETHAMINE 30 MG/ML
30 INJECTION, SOLUTION INTRAMUSCULAR; INTRAVENOUS ONCE
Status: COMPLETED | OUTPATIENT
Start: 2023-04-23 | End: 2023-04-23

## 2023-04-23 RX ORDER — ACETAMINOPHEN 500 MG
1000 TABLET ORAL EVERY 6 HOURS
Status: COMPLETED | OUTPATIENT
Start: 2023-04-23 | End: 2023-04-24

## 2023-04-23 RX ADMIN — OXYTOCIN 3 UNITS: 10 INJECTION, SOLUTION INTRAMUSCULAR; INTRAVENOUS at 17:22

## 2023-04-23 RX ADMIN — Medication 20 MILLI-UNITS/MIN: at 17:50

## 2023-04-23 RX ADMIN — METHYLERGONOVINE MALEATE 200 MCG: 0.2 INJECTION INTRAVENOUS at 17:21

## 2023-04-23 RX ADMIN — ONDANSETRON 4 MG: 2 INJECTION INTRAMUSCULAR; INTRAVENOUS at 17:18

## 2023-04-23 RX ADMIN — MIDAZOLAM 1 MG: 1 INJECTION INTRAMUSCULAR; INTRAVENOUS at 17:23

## 2023-04-23 RX ADMIN — AZITHROMYCIN 500 MG: 500 INJECTION, POWDER, LYOPHILIZED, FOR SOLUTION INTRAVENOUS at 18:13

## 2023-04-23 RX ADMIN — SODIUM CHLORIDE, POTASSIUM CHLORIDE, SODIUM LACTATE AND CALCIUM CHLORIDE: 600; 310; 30; 20 INJECTION, SOLUTION INTRAVENOUS at 17:32

## 2023-04-23 RX ADMIN — LIDOCAINE HYDROCHLORIDE AND EPINEPHRINE 2 ML: 15; 5 INJECTION, SOLUTION EPIDURAL at 08:32

## 2023-04-23 RX ADMIN — SODIUM CHLORIDE, POTASSIUM CHLORIDE, SODIUM LACTATE AND CALCIUM CHLORIDE: 600; 310; 30; 20 INJECTION, SOLUTION INTRAVENOUS at 17:12

## 2023-04-23 RX ADMIN — ROPIVACAINE HYDROCHLORIDE 14 ML/HR: 2 INJECTION, SOLUTION EPIDURAL; INFILTRATION at 08:38

## 2023-04-23 RX ADMIN — SODIUM CHLORIDE, POTASSIUM CHLORIDE, SODIUM LACTATE AND CALCIUM CHLORIDE 125 ML/HR: 600; 310; 30; 20 INJECTION, SOLUTION INTRAVENOUS at 10:47

## 2023-04-23 RX ADMIN — LIDOCAINE HYDROCHLORIDE AND EPINEPHRINE 3 ML: 15; 5 INJECTION, SOLUTION EPIDURAL at 08:29

## 2023-04-23 RX ADMIN — SODIUM CHLORIDE, POTASSIUM CHLORIDE, SODIUM LACTATE AND CALCIUM CHLORIDE 125 ML/HR: 600; 310; 30; 20 INJECTION, SOLUTION INTRAVENOUS at 05:30

## 2023-04-23 RX ADMIN — Medication 2 MILLI-UNITS/MIN: at 05:30

## 2023-04-23 RX ADMIN — ACETAMINOPHEN 1000 MG: 500 TABLET ORAL at 23:28

## 2023-04-23 RX ADMIN — OXYTOCIN 4 UNITS: 10 INJECTION, SOLUTION INTRAMUSCULAR; INTRAVENOUS at 17:28

## 2023-04-23 RX ADMIN — METOCLOPRAMIDE 10 MG: 5 INJECTION, SOLUTION INTRAMUSCULAR; INTRAVENOUS at 17:15

## 2023-04-23 RX ADMIN — LIDOCAINE HYDROCHLORIDE,EPINEPHRINE BITARTRATE 6 ML: 20; .005 INJECTION, SOLUTION EPIDURAL; INFILTRATION; INTRACAUDAL; PERINEURAL at 16:13

## 2023-04-23 RX ADMIN — CHLOROPROCAINE HYDROCHLORIDE 8 MG: 30 INJECTION, SOLUTION EPIDURAL; INFILTRATION; INTRACAUDAL; PERINEURAL at 17:15

## 2023-04-23 RX ADMIN — Medication 500 ML: at 17:22

## 2023-04-23 RX ADMIN — LIDOCAINE HYDROCHLORIDE,EPINEPHRINE BITARTRATE 10 ML: 20; .005 INJECTION, SOLUTION EPIDURAL; INFILTRATION; INTRACAUDAL; PERINEURAL at 17:08

## 2023-04-23 RX ADMIN — FENTANYL CITRATE 100 MCG: 0.05 INJECTION, SOLUTION INTRAMUSCULAR; INTRAVENOUS at 17:18

## 2023-04-23 RX ADMIN — ROPIVACAINE HYDROCHLORIDE 5 ML: 5 INJECTION, SOLUTION EPIDURAL; INFILTRATION; PERINEURAL at 08:35

## 2023-04-23 RX ADMIN — FUROSEMIDE 40 MG: 10 INJECTION INTRAMUSCULAR; INTRAVENOUS at 20:39

## 2023-04-23 RX ADMIN — SODIUM CHLORIDE, POTASSIUM CHLORIDE, SODIUM LACTATE AND CALCIUM CHLORIDE 1000 ML: 600; 310; 30; 20 INJECTION, SOLUTION INTRAVENOUS at 08:29

## 2023-04-23 RX ADMIN — OXYTOCIN 3 UNITS: 10 INJECTION, SOLUTION INTRAMUSCULAR; INTRAVENOUS at 17:26

## 2023-04-23 RX ADMIN — FENTANYL CITRATE 100 MCG: 0.05 INJECTION, SOLUTION INTRAMUSCULAR; INTRAVENOUS at 08:35

## 2023-04-23 RX ADMIN — BUTORPHANOL TARTRATE 2 MG: 1 INJECTION, SOLUTION INTRAMUSCULAR; INTRAVENOUS at 05:34

## 2023-04-23 RX ADMIN — MORPHINE SULFATE 3 MG: 0.5 INJECTION, SOLUTION EPIDURAL; INTRATHECAL; INTRAVENOUS at 17:23

## 2023-04-23 RX ADMIN — ONDANSETRON 4 MG: 2 INJECTION INTRAMUSCULAR; INTRAVENOUS at 23:25

## 2023-04-23 RX ADMIN — KETOROLAC TROMETHAMINE 30 MG: 30 INJECTION, SOLUTION INTRAMUSCULAR; INTRAVENOUS at 20:09

## 2023-04-23 NOTE — ANESTHESIA PREPROCEDURE EVALUATION
Anesthesia Evaluation     Patient summary reviewed and Nursing notes reviewed                Airway   Mallampati: III  TM distance: >3 FB  Neck ROM: full  Difficult intubation highly probable and Small opening  Dental      Pulmonary - negative pulmonary ROS   Cardiovascular - negative cardio ROS        Neuro/Psych- negative ROS  GI/Hepatic/Renal/Endo    (+) obesity, morbid obesity,      Musculoskeletal (-) negative ROS    Abdominal    Substance History - negative use     OB/GYN    (+) Pregnant,         Other - negative ROS       ROS/Med Hx Other: Previous C/S                Anesthesia Plan    ASA 3     epidural       Anesthetic plan, risks, benefits, and alternatives have been provided, discussed and informed consent has been obtained with: patient.        CODE STATUS:

## 2023-04-23 NOTE — ANESTHESIA POSTPROCEDURE EVALUATION
Patient: Angella Martin    Procedure Summary     Date: 23 Room / Location: [facility] LABOR DELIVERY   [facility]OR [facility]IV[facility]    Anesthesia Start: 821 Anesthesia Stop:     Procedure:  SECTION REPEAT (Abdomen) Diagnosis:     Surgeons: Opal Ireland MD Provider: Maria Elena Kraft DO    Anesthesia Type: epidural ASA Status: 3          Anesthesia Type: epidural    Vitals  Vitals Value Taken Time   /66 23 1708   Temp 97.5 °F (36.4 °C) 23 1600   Pulse 101 23 1743   Resp 18 23 1156   SpO2 94 % 23 1743   Vitals shown include unvalidated device data.  BP 97/55    T 97.9  SAT 94%  RR 15      Post Anesthesia Care and Evaluation    Patient location during evaluation: bedside  Patient participation: complete - patient participated  Level of consciousness: awake and awake and alert  Pain score: 0  Pain management: satisfactory to patient    Airway patency: patent  Anesthetic complications: No anesthetic complications  PONV Status: none  Cardiovascular status: acceptable, hemodynamically stable and stable  Respiratory status: acceptable  Hydration status: stable  Post Neuraxial Block status: No signs or symptoms of PDPH

## 2023-04-23 NOTE — PROCEDURES
27 y.o.  OB History        4    Para   2    Term   2            AB   1    Living   2       SAB   1    IAB        Ectopic        Molar        Multiple        Live Births   2             Presents at 41 0/7 weeks as an induction of labour due to post dates and TOLAC  Her primary OB requests a Ramirez Bulb placement to initiate the induction of labour.    Fetal Heart Rate Assessment   Method:     Beats/min:     Baseline:     Varibility:     Accels:     Decels:     Tracing Category:       TOCO:  Irregular   SVE:  2/60/-2    A Ramirez Bulb was placed without difficulties with 60 cc of sterile saline.  The patient tolerated the procedure well.

## 2023-04-23 NOTE — PROGRESS NOTES
"04/23/23  09:29 EDT  Angella Martin      ASSESSMENTS: Angella is comfortable with epidural. She is resting without complaint.     /88 (BP Location: Left arm, Patient Position: Lying)   Pulse 60   Temp 97.4 °F (36.3 °C) (Oral)   Resp 16   Ht 160 cm (63\")   Wt 94.3 kg (208 lb)   BMI 36.85 kg/m²     Fetal Heart Rate Assessment   Method: Fetal HR Assessment Method: external   Beats/min: Fetal HR (beats/min): 125   Baseline: Fetal HR Baseline: normal range   Varibility: Fetal HR Variability: moderate (amplitude range 6 to 25 bpm)   Accels: Fetal HR Accelerations: present   Decels: Fetal HR Decelerations: absent   Tracing Category:  1     Uterine Assessment   Method: Method: external tocotransducer   Frequency (min): Contraction Frequency (Minutes): 2-7   Ctx Count in 10 min:     Duration:     Intensity: Contraction Intensity: no contractions   Intensity by IUPC:     Resting Tone:     Resting Tone by IUPC:     [place] Units:                                Presentation: vertex   Cervix: Exam by: Method: sterile exam per RN   Dilation: Cervical Dilation (cm): 3   Effacement: Cervical Effacement: 50%   Station: Fetal Station: -3            Lab Results   Component Value Date    WBC 9.68 04/22/2023    HGB 12.6 04/22/2023    HCT 38.1 04/22/2023    MCV 91.1 04/22/2023     04/22/2023    URICACID 4.5 04/22/2023    AST 18 04/22/2023    ALT 8 04/22/2023     (H) 04/22/2023     Results from last 7 days   Lab Units 04/22/23  2350 04/22/23 2239   ABO TYPING  O O   RH TYPING  Positive Positive   ANTIBODY SCREEN   --  Negative       PLAN: Continue with oxytocin induction. Recheck cervix in 2-4 hours and as indicated.     Judson Dunlap CNM  09:29 EDT  04/23/23  "

## 2023-04-23 NOTE — PLAN OF CARE
Goal Outcome Evaluation:  Plan of Care Reviewed With: patient, spouse        Progress: improving  Expected management of  TOLAC attempted.

## 2023-04-23 NOTE — PROGRESS NOTES
Maxine Martin  : 1995  MRN: [id number]  CSN: [id number]    Labor progress note    Subjective   She is comfortable with the epidural.     Objective   Min/max vitals past 24 hours:  Temp  Min: 97.4 °F (36.3 °C)  Max: 98.3 °F (36.8 °C)   BP  Min: 102/56  Max: 153/88   Pulse  Min: 60  Max: 96   Resp  Min: 16  Max: 18        FHT's: reassuring, variable decelerations are present and category 2   Cervix: was checked (by me): 7 cm / 100 % / -2   Contractions: regular every 2-5 minutes - internal monitors used        Assessment   1. IUP at 41w0d  2. Fetal status reassuring  3. TOLAC     Plan   1. Continue with induction  2. Increase pitocin further get adequate MVU's  3. IUPC placed  4. Recheck cervix after adequate MVUs and as indicated by maternal-fetal status.     Judson Dunlap CNM  2023  15:28 EDT

## 2023-04-23 NOTE — ANESTHESIA PROCEDURE NOTES
Labor Epidural      Patient reassessed immediately prior to procedure    Patient location during procedure: OB  Performed By  Anesthesiologist: Maria Elena Kraft DO  Preanesthetic Checklist  Completed: patient identified, IV checked, risks and benefits discussed, surgical consent, monitors and equipment checked, pre-op evaluation and timeout performed  Additional Notes  CSE performed using 25g Abad  Prep:  Pt Position:sitting  Sterile Tech:cap, gloves, mask and sterile barrier  Prep:chlorhexidine gluconate and isopropyl alcohol  Monitoring:blood pressure monitoring  Epidural Block Procedure:  Approach:midline  Guidance:palpation technique  Location:L2-L3  Needle Type:Tuohy  Needle Gauge:17 G  Loss of Resistance Medium: air  Loss of Resistance: 5cm  Cath Depth at skin:12 cm  Paresthesia: transient (with spinal needle)  Aspiration:negative  Test Dose:negative  Number of Attempts: 1  Post Assessment:  Dressing:occlusive dressing applied and secured with tape  Pt Tolerance:patient tolerated the procedure well with no apparent complications  Complications:no

## 2023-04-23 NOTE — OP NOTE
[facility]   Section Operative Note    Pre-Operative Dx:   1.  IUP at Gestational Age: 41w0d  weeks    2. Previous  Section  3. Arrest of Dilation  4. Non reassuring Fetal status with persistent Cat 2 tracing        Postoperative dx:    1.  Same     Procedure: Procedure(s):   SECTION REPEAT   Surgeon/Assistant: Surgeon(s):  Opal Ireland MD         Anesthesia:  Anesthesiologist: Choice  Anesthesiologist: Maria Elena Kraft DO         QBL:  576mL        IV Fluids: 1500 mls.   UOP: 175 mls.    I/O this shift:  In: 1500 [I.V.:1500]  Out: 751 [Urine:175; Blood:576]   Antibiotics: cefazolin (Ancef) and Azithromycin     Infant:           Gender: female  infant    Weight: 3559 g (7 lb 13.5 oz)     Apgars: 8  @ 1 minute /     9  @ 5 minutes    Cord gases: Venous:    pH, Cord Venous   Date Value Ref Range Status   2023 7.355 7.310 - 7.370 pH Units Final        Arterial:    pH, Cord Arterial   Date Value Ref Range Status   2023 7.28 7.22 - 7.30 pH Units Final        Indication for C/Section:  Fetal Intolerance of Labor;Failure to Progress           Priority for C/Section:  routine      Procedure Details:   The patient was taken to the operating room after risks and benefits have been reviewed. The consent was reviewed and had been signed. Time Out was performed. She was prepped and draped in the normal sterile fashion in the dorsal supine position and a leftward tilt.  A Pfannenstiel skin incision made 2 cm above the pubic symphysis and carried down to the underlying layer of fascia with the scalpel.  The fascia was nicked in the midline and the incision was extended laterally with Wilson scissors.  The anterior aspect of the incision was grasped with Kocher clamps x2 and elevated and the underlying rectus muscles were dissected off sharply and bluntly.  The inferior aspect of the incision was treated similarly.  The peritoneum was identified and entered bluntly the incision  was extended with lateral traction.  A bladder blade was inserted.  The lower uterine segment was identified and incised in a transverse fashion with the scalpel.  The uterine cavity was entered bluntly and the incision was extended with cephalocaudad traction.  The fetus was then delivered atraumatically.  Cord was clamped and cut after 60 second delay and the infant was handed off to DRT staff for evaluation.  Cord blood and cord gases were obtained.  The placenta was delivered via uterine massage.  The uterus was not able to be  exteriorized due to large size. Darryl retractor was placed and the uterus was left in situ and cleared of all clot and debris with clean laparotomy sponges over several passes.  The hysterotomy was closed in a single layer with a #1 chromic in a running locking fashion.  Hemostasis was noted.  The posterior cul-de-sac was irrigated and aspirated.  The hysterotomy was again inspected and noted to be hemostatic. The paracolic gutters were irrigated and aspirated.  Hysterotomy was inspected for third time and noted to be hemostatic.  The peritoneum was closed with 2-0 chromic in a running fashion.  The rectus muscles underlying the fascia were made hemostatic with Bovie electrocautery.  The fascia was then closed with 1 Vicryl in a running stitch.  The subcutaneous tissues were irrigated aspirated and made hemostatic with Bovie electrocautery.  The subcutaneous tissues were reapproximated with 3-0 Vicryl and the skin was closed with 4-0 Monocryl in a subcuticular fashion and sealed with Dermabond.  All counts were correct and the patient was taken to the recovery room in stable condition.       Complications:   None      Disposition:   Mother to Mother Baby/Postpartum  in stable condition currently.   Baby to remains with mom  in stable condition currently.       Opal Ireland MD  4/23/2023  18:01 EDT

## 2023-04-23 NOTE — PROGRESS NOTES
[facility]     Labor Progress Note    CC: Labor    IUP 41w0d, Called to see pt for For prolonged bradycardia of 6 min    Vitals:    04/23/23 1600   BP:    Pulse:    Resp:    Temp: 97.5 °F (36.4 °C)       Fetal Heart Rate Assessment   Method: Fetal HR Assessment Method: external   Beats/min: Fetal HR (beats/min): 125   Baseline: Fetal HR Baseline: normal range   Varibility: Fetal HR Variability: moderate (amplitude range 6 to 25 bpm)   Accels: Fetal HR Accelerations: greater than/equal to 15 bpm   Decels: Fetal HR Decelerations: variable   Tracing Category:       Uterine Assessment   Method: Method: external tocotransducer   Frequency (min): Contraction Frequency (Minutes): 1-4   Ctx Count in 10 min:     Duration:     Intensity: Contraction Intensity: strong by palpation   Intensity by IUPC:     Resting Tone: Uterine Resting Tone: soft by palpation   Resting Tone by IUPC:       Cervix: Exam by: Method: sterile exam per RN   Dilation: Cervical Dilation (cm): 7   Effacement: Cervical Effacement: 100%   Station: Fetal Station: -2            Assessment: IUP 41w0d     Plan: prolonged fetal bradycardia in the setting of Cat 2 tracing most of the day with no cervical change in several hours. Proceed to OR for R CD.   Ancef/Charito/Cruzito Ireland MD  17:06 EDT  04/23/23

## 2023-04-23 NOTE — PROGRESS NOTES
"04/23/23  16:54 EDT  Angella Martin      ASSESSMENTS: Angella continues to be comfortable with epidural.     /56   Pulse 81   Temp 97.5 °F (36.4 °C) (Oral)   Resp 18   Ht 160 cm (63\")   Wt 94.3 kg (208 lb)   BMI 36.85 kg/m²     Fetal Heart Rate Assessment   Method: Fetal HR Assessment Method: external   Beats/min: Fetal HR (beats/min): 125   Baseline: Fetal HR Baseline: normal range   Varibility: Fetal HR Variability: moderate (amplitude range 6 to 25 bpm)   Accels: Fetal HR Accelerations: greater than/equal to 15 bpm   Decels: Fetal HR Decelerations: variable   Tracing Category:  2     Uterine Assessment   Method: Method: external tocotransducer   Frequency (min): Contraction Frequency (Minutes): 1-4   Ctx Count in 10 min:     Duration:     Intensity: Contraction Intensity: strong by palpation   Intensity by IUPC:     Resting Tone: Uterine Resting Tone: soft by palpation   Resting Tone by IUPC:     [place] Units:                                Presentation: vertex   Cervix: Exam by: Method: sterile exam per laborist   Dilation: Cervical Dilation (cm): 6-7   Effacement: Cervical Effacement: swollen anterior lip    Station: Fetal Station: -2            Lab Results   Component Value Date    WBC 9.68 04/22/2023    HGB 12.6 04/22/2023    HCT 38.1 04/22/2023    MCV 91.1 04/22/2023     04/22/2023    URICACID 4.5 04/22/2023    AST 18 04/22/2023    ALT 8 04/22/2023     (H) 04/22/2023     Results from last 7 days   Lab Units 04/22/23  2350 04/22/23  2239   ABO TYPING  O O   RH TYPING  Positive Positive   ANTIBODY SCREEN   --  Negative       PLAN: IV Benadryl 25mg x1. Continue with induction with titration of oxytocin to maintain adequate MVU's of 180-220. Recheck cervix in 2 hours and as indicated by maternal-fetal status.     Judson Dunlap CNM  16:54 EDT  04/23/23  "

## 2023-04-23 NOTE — PROGRESS NOTES
"04/23/23  13:35 EDT  Angella Martin      ASSESSMENTS: Angella is feeling some intermittent rectal pressure.     /72   Pulse 96   Temp 97.6 °F (36.4 °C) (Oral)   Resp 18   Ht 160 cm (63\")   Wt 94.3 kg (208 lb)   BMI 36.85 kg/m²     Fetal Heart Rate Assessment   Method: Fetal HR Assessment Method: external   Beats/min: Fetal HR (beats/min): 125   Baseline: Fetal HR Baseline: normal range   Varibility: Fetal HR Variability: moderate (amplitude range 6 to 25 bpm)   Accels: Fetal HR Accelerations: greater than/equal to 15 bpm   Decels: Fetal HR Decelerations: early, variables   Tracing Category:  2     Uterine Assessment   Method: Method: external tocotransducer   Frequency (min): Contraction Frequency (Minutes): 2-3   Ctx Count in 10 min:     Duration:     Intensity: Contraction Intensity: strong by palpation   Intensity by IUPC:     Resting Tone: Uterine Resting Tone: soft by palpation   Resting Tone by IUPC:     [place] Units:                                Presentation: vertex   Cervix: Exam by: Method: sterile exam per CNM   Dilation: Cervical Dilation (cm): 7   Effacement: Cervical Effacement: 100%   Station: Fetal Station: -2            Lab Results   Component Value Date    WBC 9.68 04/22/2023    HGB 12.6 04/22/2023    HCT 38.1 04/22/2023    MCV 91.1 04/22/2023     04/22/2023    URICACID 4.5 04/22/2023    AST 18 04/22/2023    ALT 8 04/22/2023     (H) 04/22/2023     Results from last 7 days   Lab Units 04/22/23  2350 04/22/23  2239   ABO TYPING  O O   RH TYPING  Positive Positive   ANTIBODY SCREEN   --  Negative       PLAN: Oxytocin off currently (per RN- dose was 4mu/min). Bloody urine noted in gamez catheter and bloody amniotic fluid noted. Does not appear as lor blood. FHR reactive. Dr. Ireland updated. Continue current management. Will restart oxytocin if contractions spacing out. Recheck cervix in 1-2 hours and as indicated by maternal-fetal status.      Judson Dunlap, " CNM  13:35 EDT  04/23/23

## 2023-04-23 NOTE — PROGRESS NOTES
"04/23/23  10:46 EDT  Angella Martin      ASSESSMENTS: Angella is comfortable with epidural.     /88 (BP Location: Left arm, Patient Position: Lying)   Pulse 60   Temp 97.6 °F (36.4 °C) (Oral)   Resp 16   Ht 160 cm (63\")   Wt 94.3 kg (208 lb)   BMI 36.85 kg/m²     Fetal Heart Rate Assessment   Method: Fetal HR Assessment Method: external   Beats/min: Fetal HR (beats/min): 125   Baseline: Fetal HR Baseline: normal range   Varibility: Fetal HR Variability: moderate (amplitude range 6 to 25 bpm)   Accels: Fetal HR Accelerations: present   Decels: Fetal HR Decelerations: variable   Tracing Category:  2     Uterine Assessment   Method: Method: external tocotransducer   Frequency (min): Contraction Frequency (Minutes): 2-7   Ctx Count in 10 min:     Duration:     Intensity: Contraction Intensity: moderate   Intensity by IUPC:     Resting Tone:  abd soft by palpation   Resting Tone by IUPC:     [place] Units:                                Presentation: vertex   Cervix: Exam by: Method: sterile exam per RN   Dilation: Cervical Dilation (cm): 4-5   Effacement: Cervical Effacement: 100%   Station: Fetal Station: -2            Lab Results   Component Value Date    WBC 9.68 04/22/2023    HGB 12.6 04/22/2023    HCT 38.1 04/22/2023    MCV 91.1 04/22/2023     04/22/2023    URICACID 4.5 04/22/2023    AST 18 04/22/2023    ALT 8 04/22/2023     (H) 04/22/2023     Results from last 7 days   Lab Units 04/22/23  2350 04/22/23  2239   ABO TYPING  O O   RH TYPING  Positive Positive   ANTIBODY SCREEN   --  Negative       PLAN: AROM, large amount clear fluid noted. Continue oxytocin induction. Recheck cervix in 2-4 hours and as indicated by maternal-fetal status.     Judson Dunlap CNM  10:46 EDT  04/23/23  "

## 2023-04-23 NOTE — H&P
27[facility]  Obstetric History and Physical    No chief complaint on file.      Subjective     Patient is a 27 y.o. female  currently at 41w0d, who presents for induction of labor  for post dates  with unfavorable cervix. She voices good fetal movement. She denies vaginal bleeding and leaking of fluid. She plans epidural for labor and birth.     Her prenatal care is complicated by polyhydramnios and prior   desires .  Her previous obstetric/gynecological history is remarkable for  x1 for malpresentation. She has had  x1 prior to .     The following portions of the patients history were reviewed and updated as appropriate: current medications, allergies, past medical history, past surgical history, past family history, past social history and problem list .       Prenatal Information:   Maternal Prenatal Labs  Blood Type ABO Type   Date Value Ref Range Status   2023 O  Final      Rh Status RH type   Date Value Ref Range Status   2023 Positive  Final      Antibody Screen Antibody Screen   Date Value Ref Range Status   2023 Negative  Final      Gonnorhea No results found for: GCCX   Chlamydia No results found for: CLAMYDCU   RPR No results found for: RPR   Syphilis Antibody No results found for: SYPHILIS   Rubella No results found for: RUBELLAIGGIN   Hepatitis B Surface Antigen No results found for: HEPBSAG   HIV-1 Antibody No results found for: LABHIV1   Hepatitis C Antibody No results found for: HEPCAB   Rapid Urin Drug Screen No results found for: AMPMETHU, BARBITSCNUR, LABBENZSCN, LABMETHSCN, LABOPIASCN, THCURSCR, COCAINEUR, AMPHETSCREEN, PROPOXSCN, BUPRENORSCNU, METAMPSCNUR, OXYCODONESCN, TRICYCLICSCN   Group B Strep Culture Negative                 Past OB History:       OB History    Para Term  AB Living   4 2 2 0 1 2   SAB IAB Ectopic Molar Multiple Live Births   1 0 0 0 0 2      # Outcome Date GA Lbr Frederick/2nd Weight Sex Delivery Anes PTL  Lv   4 Current            3 Term 17 39w0d   M CS-Unspec EPI  JUAN   2 Term 14 39w0d   M Vag-Spont EPI  JUAN   1 2013               Past Medical History: History reviewed. No pertinent past medical history.   Past Surgical History Past Surgical History:   Procedure Laterality Date   •  SECTION        Family History: History reviewed. No pertinent family history.   Social History:  reports that she quit smoking about 12 years ago. Her smoking use included cigarettes. She has never used smokeless tobacco.   reports that she does not currently use alcohol.   reports that she does not currently use drugs.        REVIEW OF SYSTEMS             Reports fetal movement is normal             Denies leakage of amniotic fluid.             Denies vaginal bleeding             She reports Rare contractions             All other systems reviewed and are negative    Objective       Vital Signs Range for the last 24 hours  Temperature:     Temp Source:     BP:     Pulse:     Respirations:     SPO2:     O2 Amount (l/min):     O2 Devices     Weight: Weight:  [94.3 kg (208 lb)] 94.3 kg (208 lb)       Presentation: vertex   Cervix: Exam by:  KLARISSA Dunlap CNM   Dilation:  2   Effacement: Cervical Effacement: 70%   Station:  -3       Fetal Heart Rate Assessment   Method:  external   Beats/min:     Baseline:  125   Varibility:  moderate   Accels:  present   Decels:  absent   Tracing Category:  1    NST: REACTIVE     Uterine Assessment   Method:  external   Frequency (min):     Ctx Count in 10 min:     Duration:     Intensity:     Intensity by IUPC:     Resting Tone:  abd soft by palpation   Resting Tone by IUPC:     [place] Units:             Constitutional:  Well developed, well nourished, no acute distress, well-groomed.   Respiratory:  Resp e/e/u, normal breath sounds.   Cardiovascular:  Normal rate and rhythm, no murmurs.   Gastrointestinal:  Soft, gravid, nontender.  Uterus: Soft, nontender. Fundus appropriate for  dates.  Neurologic:  Alert & oriented x 3,  no focal deficits noted.   Psychiatric:  Speech and behavior appropriate.   Extremities: no cyanosis, clubbing or edema, no evidence of DVT.        Labs:  Lab Results   Component Value Date    WBC 9.68 2023    HGB 12.6 2023    HCT 38.1 2023    MCV 91.1 2023     2023    URICACID 4.5 2023    AST 18 2023    ALT 8 2023     (H) 2023     Results from last 7 days   Lab Units 23   ABO TYPING  O   RH TYPING  Positive   ANTIBODY SCREEN  Negative           Assessment & Plan       Post term pregnancy at 41 weeks gestation        Assessment:  1.  Intrauterine pregnancy at 41w0d weeks gestation with reactive fetal status.    2.   induction of labor  for post dates  with unfavorable cervix  3.  Obstetrical history is remarkable for prior  x1.  4.  Polyhydramnios  5.  GBS status: Negative    Plan:  1.Oxytocin induction and Ramirez bulb for cervical ripening  2. Plan of care has been reviewed with patient and questions answered.  3.  Risks, benefits of treatment plan have been discussed.  4.  All questions have been answered.        Judson Dunlap CNM  2023  02:24 EDT

## 2023-04-24 LAB
BASOPHILS # BLD AUTO: 0.03 10*3/MM3 (ref 0–0.2)
BASOPHILS NFR BLD AUTO: 0.2 % (ref 0–1.5)
DEPRECATED RDW RBC AUTO: 45.7 FL (ref 37–54)
EOSINOPHIL # BLD AUTO: 0.03 10*3/MM3 (ref 0–0.4)
EOSINOPHIL NFR BLD AUTO: 0.2 % (ref 0.3–6.2)
ERYTHROCYTE [DISTWIDTH] IN BLOOD BY AUTOMATED COUNT: 13.6 % (ref 12.3–15.4)
HCT VFR BLD AUTO: 35.7 % (ref 34–46.6)
HCV AB SER DONR QL: NORMAL
HGB BLD-MCNC: 11.8 G/DL (ref 12–15.9)
IMM GRANULOCYTES # BLD AUTO: 0.08 10*3/MM3 (ref 0–0.05)
IMM GRANULOCYTES NFR BLD AUTO: 0.5 % (ref 0–0.5)
LYMPHOCYTES # BLD AUTO: 2.28 10*3/MM3 (ref 0.7–3.1)
LYMPHOCYTES NFR BLD AUTO: 13.8 % (ref 19.6–45.3)
MCH RBC QN AUTO: 30.6 PG (ref 26.6–33)
MCHC RBC AUTO-ENTMCNC: 33.1 G/DL (ref 31.5–35.7)
MCV RBC AUTO: 92.5 FL (ref 79–97)
MONOCYTES # BLD AUTO: 1.29 10*3/MM3 (ref 0.1–0.9)
MONOCYTES NFR BLD AUTO: 7.8 % (ref 5–12)
NEUTROPHILS NFR BLD AUTO: 12.76 10*3/MM3 (ref 1.7–7)
NEUTROPHILS NFR BLD AUTO: 77.5 % (ref 42.7–76)
NRBC BLD AUTO-RTO: 0 /100 WBC (ref 0–0.2)
PLATELET # BLD AUTO: 208 10*3/MM3 (ref 140–450)
PMV BLD AUTO: 9.8 FL (ref 6–12)
RBC # BLD AUTO: 3.86 10*6/MM3 (ref 3.77–5.28)
WBC NRBC COR # BLD: 16.47 10*3/MM3 (ref 3.4–10.8)

## 2023-04-24 PROCEDURE — 86803 HEPATITIS C AB TEST: CPT | Performed by: OBSTETRICS & GYNECOLOGY

## 2023-04-24 PROCEDURE — 85025 COMPLETE CBC W/AUTO DIFF WBC: CPT | Performed by: OBSTETRICS & GYNECOLOGY

## 2023-04-24 PROCEDURE — 25010000002 KETOROLAC TROMETHAMINE PER 15 MG: Performed by: OBSTETRICS & GYNECOLOGY

## 2023-04-24 RX ORDER — CETIRIZINE HYDROCHLORIDE 10 MG/1
10 TABLET ORAL DAILY PRN
Status: DISCONTINUED | OUTPATIENT
Start: 2023-04-24 | End: 2023-04-25 | Stop reason: HOSPADM

## 2023-04-24 RX ORDER — FLUTICASONE PROPIONATE 50 MCG
2 SPRAY, SUSPENSION (ML) NASAL 2 TIMES DAILY PRN
Status: DISCONTINUED | OUTPATIENT
Start: 2023-04-24 | End: 2023-04-25 | Stop reason: HOSPADM

## 2023-04-24 RX ADMIN — OXYCODONE 10 MG: 5 TABLET ORAL at 21:22

## 2023-04-24 RX ADMIN — KETOROLAC TROMETHAMINE 15 MG: 15 INJECTION, SOLUTION INTRAMUSCULAR; INTRAVENOUS at 07:57

## 2023-04-24 RX ADMIN — ACETAMINOPHEN 1000 MG: 500 TABLET ORAL at 17:55

## 2023-04-24 RX ADMIN — OXYCODONE 5 MG: 5 TABLET ORAL at 15:38

## 2023-04-24 RX ADMIN — ACETAMINOPHEN 1000 MG: 500 TABLET ORAL at 06:09

## 2023-04-24 RX ADMIN — ACETAMINOPHEN 1000 MG: 500 TABLET ORAL at 11:59

## 2023-04-24 RX ADMIN — KETOROLAC TROMETHAMINE 15 MG: 15 INJECTION, SOLUTION INTRAMUSCULAR; INTRAVENOUS at 19:41

## 2023-04-24 RX ADMIN — PRENATAL VITAMINS-IRON FUMARATE 27 MG IRON-FOLIC ACID 0.8 MG TABLET 1 TABLET: at 08:00

## 2023-04-24 RX ADMIN — ACETAMINOPHEN 325MG 650 MG: 325 TABLET ORAL at 23:46

## 2023-04-24 RX ADMIN — FLUTICASONE PROPIONATE 2 SPRAY: 50 SPRAY, METERED NASAL at 11:59

## 2023-04-24 RX ADMIN — KETOROLAC TROMETHAMINE 15 MG: 15 INJECTION, SOLUTION INTRAMUSCULAR; INTRAVENOUS at 13:42

## 2023-04-24 RX ADMIN — KETOROLAC TROMETHAMINE 15 MG: 15 INJECTION, SOLUTION INTRAMUSCULAR; INTRAVENOUS at 02:22

## 2023-04-24 NOTE — PAYOR COMM NOTE
"Shannon Robertsone (27 y.o. Female) Notification of maternity admission with delivery  Name on insurance card:  Sonja Dutton  ID [id number]    Date of Birth   1995    Social Security Number       Address   [address] [place] [address]    Home Phone   [phone]    MRN   [id number]       Jew   Oriental orthodox    Marital Status                               Admission Date   4/22/23    Admission Type   Elective    Admitting Provider   Opal Ireland MD    Attending Provider   Judson Dunlap CNM    Department, Room/Bed   [facility] MOTHER BABY 4A, N408/1       Discharge Date       Discharge Disposition       Discharge Destination                               Attending Provider: Judson Dunlap CNM    Allergies: No Known Allergies    Isolation: None   Infection: None   Code Status: CPR    Ht: 160 cm (63\")   Wt: 94.3 kg (208 lb)    Admission Cmt: None   Principal Problem: Post term pregnancy at 41 weeks gestation [O48.0,Z3A.41]                 Active Insurance as of 4/22/2023     Primary Coverage     Payor Plan Insurance Group Employer/Plan Group    ANTH MEDICAID ANTHEM MEDICAID [id number]     Payor Plan Address Payor Plan Phone Number Payor Plan Fax Number Effective Dates    [address] [address] [phone]  3/1/2023 - None Entered    [facility] [address]       Subscriber Name Subscriber Birth Date Member ID       SONJA ROBERTSON 1995 [id number]                 Emergency Contacts      (Rel.) Home Phone Work Phone Mobile Phone    AILYNERICKSON (Spouse) -- -- [phone]            Insurance Information                ANTHEM MEDICAID/ANTHEM MEDICAID Phone: [phone]    Subscriber: RobertsonSonja Subscriber#: [id number]    Group#: [id number] Precert#: --             History & Physical      Judson Dunlap CNM at 04/23/23 0224     Attestation signed by Opal Ireland MD at 04/23/23 0471    I have reviewed this documentation and " agree.                  [address] [place] [address]  Obstetric History and Physical    No chief complaint on file.      Subjective      Patient is a 27 y.o. female  currently at 41w0d, who presents for induction of labor  for post dates  with unfavorable cervix. She voices good fetal movement. She denies vaginal bleeding and leaking of fluid. She plans epidural for labor and birth.     Her prenatal care is complicated by polyhydramnios and prior   desires .  Her previous obstetric/gynecological history is remarkable for  x1 for malpresentation. She has had  x1 prior to .     The following portions of the patients history were reviewed and updated as appropriate: current medications, allergies, past medical history, past surgical history, past family history, past social history and problem list .       Prenatal Information:   Maternal Prenatal Labs  Blood Type ABO Type   Date Value Ref Range Status   2023 O  Final      Rh Status RH type   Date Value Ref Range Status   2023 Positive  Final      Antibody Screen Antibody Screen   Date Value Ref Range Status   2023 Negative  Final      Gonnorhea No results found for: GCCX   Chlamydia No results found for: CLAMYDCU   RPR No results found for: RPR   Syphilis Antibody No results found for: SYPHILIS   Rubella No results found for: RUBELLAIGGIN   Hepatitis B Surface Antigen No results found for: HEPBSAG   HIV-1 Antibody No results found for: LABHIV1   Hepatitis C Antibody No results found for: HEPCAB   Rapid Urin Drug Screen No results found for: AMPMETHU, BARBITSCNUR, LABBENZSCN, LABMETHSCN, LABOPIASCN, THCURSCR, COCAINEUR, AMPHETSCREEN, PROPOXSCN, BUPRENORSCNU, METAMPSCNUR, OXYCODONESCN, TRICYCLICSCN   Group B Strep Culture Negative                 Past OB History:       OB History    Para Term  AB Living   4 2 2 0 1 2   SAB IAB Ectopic Molar Multiple Live Births   1 0 0 0 0 2      # Outcome Date GA Lbr Frederick/2nd  Weight Sex Delivery Anes PTL Lv   4 Current            3 Term 17 39w0d   M CS-Unspec EPI  JUAN   2 Term 14 39w0d   M Vag-Spont EPI  JUAN   1 2013               Past Medical History: History reviewed. No pertinent past medical history.   Past Surgical History Past Surgical History:   Procedure Laterality Date   •  SECTION        Family History: History reviewed. No pertinent family history.   Social History:  reports that she quit smoking about 12 years ago. Her smoking use included cigarettes. She has never used smokeless tobacco.   reports that she does not currently use alcohol.   reports that she does not currently use drugs.        REVIEW OF SYSTEMS             Reports fetal movement is normal             Denies leakage of amniotic fluid.             Denies vaginal bleeding             She reports Rare contractions             All other systems reviewed and are negative    Objective        Vital Signs Range for the last 24 hours  Temperature:     Temp Source:     BP:     Pulse:     Respirations:     SPO2:     O2 Amount (l/min):     O2 Devices     Weight: Weight:  [94.3 kg (208 lb)] 94.3 kg (208 lb)       Presentation: vertex   Cervix: Exam by:  KLARISSA Dunlap CNM   Dilation:  2   Effacement: Cervical Effacement: 70%   Station:  -3       Fetal Heart Rate Assessment   Method:  external   Beats/min:     Baseline:  125   Varibility:  moderate   Accels:  present   Decels:  absent   Tracing Category:  1    NST: REACTIVE     Uterine Assessment   Method:  external   Frequency (min):     Ctx Count in 10 min:     Duration:     Intensity:     Intensity by IUPC:     Resting Tone:  abd soft by palpation   Resting Tone by IUPC:     [place] Units:             Constitutional:  Well developed, well nourished, no acute distress, well-groomed.   Respiratory:  Resp e/e/u, normal breath sounds.   Cardiovascular:  Normal rate and rhythm, no murmurs.   Gastrointestinal:  Soft, gravid, nontender.  Uterus: Soft,  nontender. Fundus appropriate for dates.  Neurologic:  Alert & oriented x 3,  no focal deficits noted.   Psychiatric:  Speech and behavior appropriate.   Extremities: no cyanosis, clubbing or edema, no evidence of DVT.        Labs:  Lab Results   Component Value Date    WBC 9.68 2023    HGB 12.6 2023    HCT 38.1 2023    MCV 91.1 2023     2023    URICACID 4.5 2023    AST 18 2023    ALT 8 2023     (H) 2023     Results from last 7 days   Lab Units 23   ABO TYPING  O   RH TYPING  Positive   ANTIBODY SCREEN  Negative           Assessment & Plan       Post term pregnancy at 41 weeks gestation        Assessment:  1.  Intrauterine pregnancy at 41w0d weeks gestation with reactive fetal status.    2.   induction of labor  for post dates  with unfavorable cervix  3.  Obstetrical history is remarkable for prior  x1.  4.  Polyhydramnios  5.  GBS status: Negative    Plan:  1.Oxytocin induction and Ramirez bulb for cervical ripening  2. Plan of care has been reviewed with patient and questions answered.  3.  Risks, benefits of treatment plan have been discussed.  4.  All questions have been answered.        Judson Dunlap CNM  2023  02:24 EDT    Electronically signed by Opal Ireland MD at 23 0938          Operative/Procedure Notes (last 48 hours)      García Maurer MD at 23 0253          27 y.o.  OB History        4    Para   2    Term   2            AB   1    Living   2       SAB   1    IAB        Ectopic        Molar        Multiple        Live Births   2             Presents at 41 0/7 weeks as an induction of labour due to post dates and TOLAC  Her primary OB requests a Ramirez Bulb placement to initiate the induction of labour.    Fetal Heart Rate Assessment   Method:     Beats/min:     Baseline:     Varibility:     Accels:     Decels:     Tracing Category:       TOCO:  Irregular   SVE:  2/60/-2    A  Ramirez Bulb was placed without difficulties with 60 cc of sterile saline.  The patient tolerated the procedure well.    Electronically signed by García Maurer MD at 23 0254     Opal Ireland MD at 23 2277          [facility]   Section Operative Note    Pre-Operative Dx:   1.  IUP at Gestational Age: 41w0d  weeks    2. Previous  Section  3. Arrest of Dilation  4. Non reassuring Fetal status with persistent Cat 2 tracing        Postoperative dx:    1.  Same     Procedure: Procedure(s):   SECTION REPEAT   Surgeon/Assistant: Surgeon(s):  Opal Ireland MD         Anesthesia:  Anesthesiologist: Choice  Anesthesiologist: Maria Elena Kraft DO         QBL:  576mL        IV Fluids: 1500 mls.   UOP: 175 mls.    I/O this shift:  In: 1500 [I.V.:1500]  Out: 751 [Urine:175; Blood:576]   Antibiotics: cefazolin (Ancef) and Azithromycin     Infant:           Gender: female  infant    Weight: 3559 g (7 lb 13.5 oz)     Apgars: 8  @ 1 minute /     9  @ 5 minutes    Cord gases: Venous:    pH, Cord Venous   Date Value Ref Range Status   2023 7.355 7.310 - 7.370 pH Units Final        Arterial:    pH, Cord Arterial   Date Value Ref Range Status   2023 7.28 7.22 - 7.30 pH Units Final        Indication for C/Section:  Fetal Intolerance of Labor;Failure to Progress           Priority for C/Section:  routine      Procedure Details:   The patient was taken to the operating room after risks and benefits have been reviewed. The consent was reviewed and had been signed. Time Out was performed. She was prepped and draped in the normal sterile fashion in the dorsal supine position and a leftward tilt.  A Pfannenstiel skin incision made 2 cm above the pubic symphysis and carried down to the underlying layer of fascia with the scalpel.  The fascia was nicked in the midline and the incision was extended laterally with Wilson scissors.  The anterior aspect of the incision was grasped  with Kocher clamps x2 and elevated and the underlying rectus muscles were dissected off sharply and bluntly.  The inferior aspect of the incision was treated similarly.  The peritoneum was identified and entered bluntly the incision was extended with lateral traction.  A bladder blade was inserted.  The lower uterine segment was identified and incised in a transverse fashion with the scalpel.  The uterine cavity was entered bluntly and the incision was extended with cephalocaudad traction.  The fetus was then delivered atraumatically.  Cord was clamped and cut after 60 second delay and the infant was handed off to DRT staff for evaluation.  Cord blood and cord gases were obtained.  The placenta was delivered via uterine massage.  The uterus was not able to be  exteriorized due to large size. Darryl retractor was placed and the uterus was left in situ and cleared of all clot and debris with clean laparotomy sponges over several passes.  The hysterotomy was closed in a single layer with a #1 chromic in a running locking fashion.  Hemostasis was noted.  The posterior cul-de-sac was irrigated and aspirated.  The hysterotomy was again inspected and noted to be hemostatic. The paracolic gutters were irrigated and aspirated.  Hysterotomy was inspected for third time and noted to be hemostatic.  The peritoneum was closed with 2-0 chromic in a running fashion.  The rectus muscles underlying the fascia were made hemostatic with Bovie electrocautery.  The fascia was then closed with 1 Vicryl in a running stitch.  The subcutaneous tissues were irrigated aspirated and made hemostatic with Bovie electrocautery.  The subcutaneous tissues were reapproximated with 3-0 Vicryl and the skin was closed with 4-0 Monocryl in a subcuticular fashion and sealed with Dermabond.  All counts were correct and the patient was taken to the recovery room in stable condition.       Complications:   None      Disposition:   Mother to Mother  Baby/Postpartum  in stable condition currently.   Baby to remains with mom  in stable condition currently.       Opal Ireland MD  4/23/2023  18:01 EDT        Electronically signed by Opal Ireland MD at 04/23/23 1804          Physician Progress Notes (last 48 hours)      Opal Ireland MD at 04/23/23 1706          [facility]     Labor Progress Note    CC: Labor    IUP 41w0d, Called to see pt for For prolonged bradycardia of 6 min    Vitals:    04/23/23 1600   BP:    Pulse:    Resp:    Temp: 97.5 °F (36.4 °C)       Fetal Heart Rate Assessment   Method: Fetal HR Assessment Method: external   Beats/min: Fetal HR (beats/min): 125   Baseline: Fetal HR Baseline: normal range   Varibility: Fetal HR Variability: moderate (amplitude range 6 to 25 bpm)   Accels: Fetal HR Accelerations: greater than/equal to 15 bpm   Decels: Fetal HR Decelerations: variable   Tracing Category:       Uterine Assessment   Method: Method: external tocotransducer   Frequency (min): Contraction Frequency (Minutes): 1-4   Ctx Count in 10 min:     Duration:     Intensity: Contraction Intensity: strong by palpation   Intensity by IUPC:     Resting Tone: Uterine Resting Tone: soft by palpation   Resting Tone by IUPC:       Cervix: Exam by: Method: sterile exam per RN   Dilation: Cervical Dilation (cm): 7   Effacement: Cervical Effacement: 100%   Station: Fetal Station: -2            Assessment: IUP 41w0d     Plan: prolonged fetal bradycardia in the setting of Cat 2 tracing most of the day with no cervical change in several hours. Proceed to OR for R CD.   Ancef/Charito/Cruzito Ireland MD  17:06 EDT  04/23/23      Electronically signed by Opal Ireland MD at 04/23/23 0343

## 2023-04-24 NOTE — LACTATION NOTE
23 0910   Maternal Information   Date of Referral 23   Person Making Referral lactation consultant  (newly postpartum)   Maternal Reason for Referral breastfeeding unsuccessful in past  (only breastfeed days with first and second child.)   Infant Reason for Referral  infant   Maternal Assessment   Breast Size Issue none   Breast Shape Bilateral:;round   Breast Density Bilateral:;soft   Nipples Bilateral:;everted;other (see comments)  (wide)   Left Nipple Symptoms intact;nontender   Right Nipple Symptoms intact;nontender   Maternal Infant Feeding   Maternal Emotional State receptive;relaxed   Infant Positioning clutch/football  (right football)   Signs of Milk Transfer deep jaw excursions noted   Pain with Feeding no   Comfort Measures Before/During Feeding maternal position adjusted;infant position adjusted;latch adjusted;suction broken using finger   Latch Assistance minimal assistance   Support Person Involvement other (see comments)  (not present at bedside)   Milk Expression/Equipment   Breast Pump Type double electric, personal  (Momcozy)   Breast Pump Flange Type hard   Equipment for Home Use new breast pump purchased   Breast Pumping   Breast Pumping Interventions other (see comments)  (can pump for short or missed feeds)   Breast Pumping bilateral breasts pumped until soft     Courtesy visit with mother that desires to breastfeed.  Went over basic breastfeeding education and provided written materials with qr code for breastfeeding 101.  Encouraged mother to breastfeed every 3 hours or more often with feeding cues.  Went over stimulation and waking techniques during the  period.  Encouraged frequent skin to skin over the next 2 weeks and putting infant to breast for optimal milk supply.

## 2023-04-24 NOTE — ANESTHESIA POSTPROCEDURE EVALUATION
Patient: Angella Martin    Procedure Summary     Date: 23 Room / Location: [facility] LABOR DELIVERY   [facility] LABOR DELIVERY    Anesthesia Start: 821 Anesthesia Stop:     Procedure:  SECTION REPEAT (Abdomen) Diagnosis:     Surgeons: Opal Ireland MD Provider: Maria Elena Kraft DO    Anesthesia Type: epidural ASA Status: 3          Anesthesia Type: epidural    Vitals  Vitals Value Taken Time   /75 23 0725   Temp 98.6 °F (37 °C) 23 0725   Pulse 83 23 0725   Resp 16 23 0725   SpO2 100 % 23           Post Anesthesia Care and Evaluation    Patient location during evaluation: bedside  Patient participation: complete - patient participated  Level of consciousness: awake and alert  Pain management: adequate    Airway patency: patent  Anesthetic complications: No anesthetic complications    Cardiovascular status: acceptable  Respiratory status: acceptable  Hydration status: acceptable  Post Neuraxial Block status: Motor and sensory function returned to baseline and No signs or symptoms of PDPH

## 2023-04-24 NOTE — PROGRESS NOTES
2023    Name:Angella Martin    MR#:[id number]     PROGRESS NOTE:  Post-Op 1 S/P        Subjective   27 y.o. yo Female  s/p CS at 41w0d doing well. Pain well controlled, lochia appropriate. She is requesting something for allergies. States she tried to get out of bed overnight and was very dizzy. Feeling better this morning. She had issues with vomiting over night per RN and had poor urine output. Had a fluid bolus this morning (time not documented). Still concentrated and small urine output. Starting to tolerate PO fluids.     Patient Active Problem List   Diagnosis   • Post term pregnancy at 41 weeks gestation        Objective    Vitals  Temp:  Temp:  [97.5 °F (36.4 °C)-99.5 °F (37.5 °C)] 98.6 °F (37 °C)  Temp src: Oral  BP:  BP: ()/(55-99) 123/75  Pulse:  Heart Rate:  [] 83  RR:   Resp:  [16-18] 16    General Awake, alert, no distress  Abdomen Soft, nondistended, fundus firm, below umbilicus, appropriately tender  Incision  Intact, no erythema or exudate  Extremities Calves NT bilaterally     I/O last 3 completed shifts:  In: 1500 [I.V.:1500]  Out: 3061 [Urine:2485; Blood:576]    Lab Results   Component Value Date    WBC 16.47 (H) 2023    HGB 11.8 (L) 2023    HCT 35.7 2023    MCV 92.5 2023     2023    URICACID 4.5 2023    AST 18 2023    ALT 8 2023     Results from last 7 days   Lab Units 23  2350 23  2239   ABO TYPING  O O   RH TYPING  Positive Positive   ANTIBODY SCREEN   --  Negative       Infant: female       Assessment   1.  POD 1 from  Section    Plan: Doing well.    Await appropriate urine output prior to removal of gamez catheter. Continue po hydration, advance diet as tolerated          Marge Patel CNM  2023 08:41 EDT

## 2023-04-25 VITALS
DIASTOLIC BLOOD PRESSURE: 91 MMHG | TEMPERATURE: 98.3 F | OXYGEN SATURATION: 100 % | WEIGHT: 208 LBS | RESPIRATION RATE: 18 BRPM | HEART RATE: 70 BPM | HEIGHT: 63 IN | BODY MASS INDEX: 36.86 KG/M2 | SYSTOLIC BLOOD PRESSURE: 138 MMHG

## 2023-04-25 PROBLEM — O48.0 POST TERM PREGNANCY AT 41 WEEKS GESTATION: Status: RESOLVED | Noted: 2023-04-23 | Resolved: 2023-04-25

## 2023-04-25 PROBLEM — Z98.891 STATUS POST REPEAT LOW TRANSVERSE CESAREAN SECTION: Status: ACTIVE | Noted: 2023-04-25

## 2023-04-25 PROBLEM — Z3A.41 POST TERM PREGNANCY AT 41 WEEKS GESTATION: Status: RESOLVED | Noted: 2023-04-23 | Resolved: 2023-04-25

## 2023-04-25 RX ORDER — PSEUDOEPHEDRINE HCL 30 MG
100 TABLET ORAL 2 TIMES DAILY PRN
Qty: 60 CAPSULE | Refills: 1 | Status: SHIPPED | OUTPATIENT
Start: 2023-04-25

## 2023-04-25 RX ORDER — IBUPROFEN 600 MG/1
600 TABLET ORAL EVERY 6 HOURS PRN
Qty: 60 TABLET | Refills: 1 | Status: SHIPPED | OUTPATIENT
Start: 2023-04-25

## 2023-04-25 RX ORDER — OXYCODONE HYDROCHLORIDE 5 MG/1
5 TABLET ORAL EVERY 4 HOURS PRN
Qty: 15 TABLET | Refills: 0 | Status: SHIPPED | OUTPATIENT
Start: 2023-04-25 | End: 2023-04-30

## 2023-04-25 RX ADMIN — IBUPROFEN 600 MG: 600 TABLET, FILM COATED ORAL at 08:15

## 2023-04-25 RX ADMIN — ACETAMINOPHEN 325MG 650 MG: 325 TABLET ORAL at 05:09

## 2023-04-25 RX ADMIN — PRENATAL VITAMINS-IRON FUMARATE 27 MG IRON-FOLIC ACID 0.8 MG TABLET 1 TABLET: at 08:15

## 2023-04-25 RX ADMIN — IBUPROFEN 600 MG: 600 TABLET, FILM COATED ORAL at 02:05

## 2023-04-25 RX ADMIN — ACETAMINOPHEN 325MG 650 MG: 325 TABLET ORAL at 11:07

## 2023-04-25 RX ADMIN — OXYCODONE 10 MG: 5 TABLET ORAL at 09:55

## 2023-04-25 RX ADMIN — OXYCODONE 5 MG: 5 TABLET ORAL at 05:43

## 2023-04-25 NOTE — PROGRESS NOTES
2023    Name:Angella Martin    MR#:[id number]     PROGRESS NOTE:  Post-Op 2 S/P        Subjective   27 y.o. yo Female  s/p CS at 41w0d doing well. Pain well controlled, lochia appropriate, tolerating diet. She is breastfeeding. She desires discharge today.     Patient Active Problem List   Diagnosis   • Status post repeat low transverse  section        Objective    Vitals  Temp:  Temp:  [97.9 °F (36.6 °C)-98.2 °F (36.8 °C)] 97.9 °F (36.6 °C)  Temp src: Oral  BP:  BP: (106-140)/(65-88) 140/88  Pulse:  Heart Rate:  [77-87] 87  RR:   Resp:  [16] 16    General Awake, alert, no distress  Abdomen Soft, non-distended, fundus firm, below umbilicus, appropriately tender  Incision  Intact, no erythema or exudate  Extremities Calves NT bilaterally     I/O last 3 completed shifts:  In: -   Out: 4260 [Urine:4260]    Lab Results   Component Value Date    WBC 16.47 (H) 2023    HGB 11.8 (L) 2023    HCT 35.7 2023    MCV 92.5 2023     2023    URICACID 4.5 2023    AST 18 2023    ALT 8 2023     (H) 2023     Results from last 7 days   Lab Units 23  2350 23  2239   ABO TYPING  O O   RH TYPING  Positive Positive   ANTIBODY SCREEN   --  Negative       Infant: female       Assessment   1.  POD 2 from  Section    Plan: Doing well.    Consideration for d/c as clinically indicated.           Judson Dunlap CNM  2023 08:46 EDT

## 2023-04-25 NOTE — DISCHARGE SUMMARY
[facility]   Discharge Summary      Patient: Angella Martin      MR#:[id number]  Admission  Diagnosis: Post term pregnancy at 41 weeks gestation  Discharge Diagnosis:   1. Status post repeat low transverse  section        Date of Admission: 2023  Date of Discharge:  2023    Procedures:  , Low Transverse     2023    5:20 PM      Service:  Obstetrics    Hospital Course:  Patient underwent  section and remained in the hospital for 2 days.  During that time she remained afebrile and hemodynamically stable.  On the day of discharge, she was eating, ambulating and voiding without difficulty.  She is breastfeeding.       Labs:    Lab Results (last 24 hours)     ** No results found for the last 24 hours. **          Discharge Medications     Discharge Medications      New Medications      Instructions Start Date   docusate sodium 100 MG capsule   100 mg, Oral, 2 Times Daily PRN      ibuprofen 600 MG tablet  Commonly known as: ADVIL,MOTRIN   600 mg, Oral, Every 6 Hours PRN      oxyCODONE 5 MG immediate release tablet  Commonly known as: ROXICODONE   5 mg, Oral, Every 4 Hours PRN         Continue These Medications      Instructions Start Date   Prenatal 27-1 27-1 MG tablet tablet   1 tablet, Oral, Daily             Discharge Disposition:  To Home    Discharge Condition:  Stable    Discharge Diet: regular    Activity at Discharge: pelvic rest. No driving for 2 weeks.     Follow-up Appointments  Incision check in 2 weeks.      Judson Dunlap CNM  23  08:46 EDT

## 2023-04-25 NOTE — PAYOR COMM NOTE
"Sonja Robertson (27 y.o. Female) Notice of discharge  [id number]    Date of Birth   1995    Social Security Number       Address   [address] [place] [address]    Home Phone   [phone]    MRN   [id number]       [employer]   [employer]ism    Marital Status                               Admission Date   23    Admission Type   Elective    Admitting Provider   Opal Ireland MD    Attending Provider       Department, Room/Bed   [facility]AB[facility] 4A, N408/1       Discharge Date   2023    Discharge Disposition   Home or Self Care    Discharge Destination                               Attending Provider: (none)   Allergies: No Known Allergies    Isolation: None   Infection: None   Code Status: CPR    Ht: 160 cm (63\")   Wt: 94.3 kg (208 lb)    Admission Cmt: None   Principal Problem: Post term pregnancy at 41 weeks gestation [O48.0,Z3A.41]                 Active Insurance as of 2023     Primary Coverage     Payor Plan Insurance Group Employer/Plan Group    ANTHEM MEDICAID AN[employer] MED[employer]ID [id number]     Payor Plan Address Payor Plan Phone Number Payor Plan Fax Number Effective Dates    [address] [address] [phone]  3/1/2023 - None Entered    [facility] [address]       Subscriber Name Subscriber Birth Date Member ID       SONJA ROBERTSON 1995 [id number]                 Emergency Contacts      (Rel.) Home Phone Work Phone Mobile Phone    ERICKSON ROBERTSON (Spouse) -- -- [phone]            Insurance Information                [employer]HEM [employer]ICAID/[employer]HEM [employer]ICAID Phone: [phone]    Subscriber: Sonja Robertson Subscriber#: [id number]    Group#: [id number] Precert#: --             Discharge Summary      Judson Dunlap CNM at 23 0846     Attestation signed by Canavan, Allison, MD at 23 1214    I have reviewed this documentation and agree.                  [facility]   Discharge Summary      Patient: Sonja" Mario      MR#:[id number]  Admission  Diagnosis: Post term pregnancy at 41 weeks gestation  Discharge Diagnosis:   1. Status post repeat low transverse  section        Date of Admission: 2023  Date of Discharge:  2023    Procedures:  , Low Transverse     2023    5:20 PM      Service:  Obstetrics    Hospital Course:  Patient underwent  section and remained in the hospital for 2 days.  During that time she remained afebrile and hemodynamically stable.  On the day of discharge, she was eating, ambulating and voiding without difficulty.  She is breastfeeding.       Labs:    Lab Results (last 24 hours)     ** No results found for the last 24 hours. **          Discharge Medications     Discharge Medications      New Medications      Instructions Start Date   docusate sodium 100 MG capsule   100 mg, Oral, 2 Times Daily PRN      ibuprofen 600 MG tablet  Commonly known as: ADVIL,MOTRIN   600 mg, Oral, Every 6 Hours PRN      oxyCODONE 5 MG immediate release tablet  Commonly known as: ROXICODONE   5 mg, Oral, Every 4 Hours PRN         Continue These Medications      Instructions Start Date   Prenatal 27-1 27-1 MG tablet tablet   1 tablet, Oral, Daily             Discharge Disposition:  To Home    Discharge Condition:  Stable    Discharge Diet: regular    Activity at Discharge: pelvic rest. No driving for 2 weeks.     Follow-up Appointments  Incision check in 2 weeks.      Judson Dunlap CNM  23  08:46 EDT    Electronically signed by Canavan, Allison, MD at 23 8964

## 2023-04-27 ENCOUNTER — NURSE TRIAGE (OUTPATIENT)
Dept: CALL CENTER | Facility: HOSPITAL | Age: 28
End: 2023-04-27
Payer: MEDICAID

## 2023-04-28 ENCOUNTER — LAB (OUTPATIENT)
Dept: LAB | Facility: HOSPITAL | Age: 28
End: 2023-04-28
Payer: MEDICAID

## 2023-04-28 ENCOUNTER — TRANSCRIBE ORDERS (OUTPATIENT)
Dept: LAB | Facility: HOSPITAL | Age: 28
End: 2023-04-28
Payer: MEDICAID

## 2023-04-28 DIAGNOSIS — R03.0 ELEVATED BLOOD PRESSURE READING WITHOUT DIAGNOSIS OF HYPERTENSION: ICD-10-CM

## 2023-04-28 DIAGNOSIS — R03.0 ELEVATED BLOOD PRESSURE READING WITHOUT DIAGNOSIS OF HYPERTENSION: Primary | ICD-10-CM

## 2023-04-28 LAB
BASOPHILS # BLD AUTO: 0.04 10*3/MM3 (ref 0–0.2)
BASOPHILS NFR BLD AUTO: 0.4 % (ref 0–1.5)
DEPRECATED RDW RBC AUTO: 41.2 FL (ref 37–54)
EOSINOPHIL # BLD AUTO: 0.23 10*3/MM3 (ref 0–0.4)
EOSINOPHIL NFR BLD AUTO: 2.4 % (ref 0.3–6.2)
ERYTHROCYTE [DISTWIDTH] IN BLOOD BY AUTOMATED COUNT: 12.9 % (ref 12.3–15.4)
HCT VFR BLD AUTO: 34.8 % (ref 34–46.6)
HGB BLD-MCNC: 12.1 G/DL (ref 12–15.9)
IMM GRANULOCYTES # BLD AUTO: 0.07 10*3/MM3 (ref 0–0.05)
IMM GRANULOCYTES NFR BLD AUTO: 0.7 % (ref 0–0.5)
LYMPHOCYTES # BLD AUTO: 2.76 10*3/MM3 (ref 0.7–3.1)
LYMPHOCYTES NFR BLD AUTO: 29.1 % (ref 19.6–45.3)
MCH RBC QN AUTO: 30.5 PG (ref 26.6–33)
MCHC RBC AUTO-ENTMCNC: 34.8 G/DL (ref 31.5–35.7)
MCV RBC AUTO: 87.7 FL (ref 79–97)
MONOCYTES # BLD AUTO: 0.66 10*3/MM3 (ref 0.1–0.9)
MONOCYTES NFR BLD AUTO: 7 % (ref 5–12)
NEUTROPHILS NFR BLD AUTO: 5.71 10*3/MM3 (ref 1.7–7)
NEUTROPHILS NFR BLD AUTO: 60.4 % (ref 42.7–76)
NRBC BLD AUTO-RTO: 0 /100 WBC (ref 0–0.2)
PLATELET # BLD AUTO: 348 10*3/MM3 (ref 140–450)
PMV BLD AUTO: 9.5 FL (ref 6–12)
RBC # BLD AUTO: 3.97 10*6/MM3 (ref 3.77–5.28)
WBC NRBC COR # BLD: 9.47 10*3/MM3 (ref 3.4–10.8)

## 2023-04-28 PROCEDURE — 84156 ASSAY OF PROTEIN URINE: CPT

## 2023-04-28 PROCEDURE — 84550 ASSAY OF BLOOD/URIC ACID: CPT

## 2023-04-28 PROCEDURE — 80053 COMPREHEN METABOLIC PANEL: CPT

## 2023-04-28 PROCEDURE — 82570 ASSAY OF URINE CREATININE: CPT

## 2023-04-28 PROCEDURE — 36415 COLL VENOUS BLD VENIPUNCTURE: CPT

## 2023-04-28 PROCEDURE — 83615 LACTATE (LD) (LDH) ENZYME: CPT

## 2023-04-28 PROCEDURE — 85025 COMPLETE CBC W/AUTO DIFF WBC: CPT

## 2023-04-28 NOTE — TELEPHONE ENCOUNTER
"    Reason for Disposition  • [1] MODERATE leg swelling (e.g., more than just ankles, below knees) AND [2] lasts > 5 days postpartum    Additional Information  • Negative: SEVERE difficulty breathing (e.g., struggling for each breath, speaks in single words)  • Negative: Sounds like a life-threatening emergency to the triager  • Negative: Followed a leg injury  • Negative: [1] Followed an insect bite AND [2] localized swelling (e.g., small area of puffy or swollen skin)  • Negative: Swelling only of ankle  • Negative: Swelling only of knee  • Negative: Chest pain  • Negative: [1] Difficulty breathing AND [2] new-onset or worsening  • Negative: SEVERE leg swelling (e.g., swelling extends above knee, entire leg is swollen)  • Negative: [1] Red area or streak AND [2] fever  • Negative: [1] Swelling is painful to touch AND [2] fever  • Negative: [1] Cast on leg or ankle AND [2] now increased pain  • Negative: Swelling of face, arm or hands  (Exception: Slight puffiness of fingers.)  • Negative: SEVERE headache  • Negative: New blurred vision or vision changes  • Negative: Upper abdominal pain lasts > 1 hour  • Negative: Patient sounds very sick or weak to the triager  • Negative: Preeclampsia or high blood pressure during pregnancy  • Negative: Systolic BP >= 140 OR Diastolic BP >= 90  • Negative: [1] Thigh or calf pain AND [2] only 1 side AND [3] present > 1 hour  • Negative: [1] Thigh, calf, or ankle swelling AND [2] only 1 side  • Negative: [1] Thigh, calf, or ankle swelling AND [2] bilateral AND [3] 1 side is more swollen  • Negative: [1] Red area or streak [2] large (> 2 in. or 5 cm)    Answer Assessment - Initial Assessment Questions  1. ONSET: \"When did the swelling start?\" (e.g., minutes, hours, days)       yesterday  2. LOCATION: \"What part of the leg is swollen?\"  \"Are both legs swollen or just one leg?\"      Both legs up to knees  3. SEVERITY: \"How bad is the swelling?\" (e.g., localized; mild, moderate, " "severe)    - LOCALIZED: Localized to one leg; small area of puffy or swollen skin (e.g., insect bite, skin irritation).    - MILD: Swelling limited to foot and ankle, pitting edema < 1/4 inch (6 mm) deep, rest  and elevation eliminate most or all swelling.    - MODERATE: Swelling of lower leg to knee, pitting edema > 1/4 inch (6 mm) deep, rest and elevation only partially reduce swelling.    - SEVERE: Swelling extends above knee, facial or hand swelling present.      moderate  4. no  5. PAIN: \"Is the swelling painful to touch?\" If Yes, ask: \"How painful is it?\"   (Scale 1-10; mild, moderate or severe)      5  6. FEVER: \"Do you have a fever?\" If Yes, ask: \"What is it, how was it measured, and when did it start?\"       denies  7. MEDICAL HISTORY: \"Do you have a history of blood clots, heart failure, kidney disease, liver failure, or preeclampsia?\"      denies  8. OTHER SYMPTOMS: \"Do you have any other symptoms?\" (e.g., chest pain, difficulty breathing)      deniezs vision changes, epigastric pain. Stated has slight h/a  9. DELIVERY DATE: \"When was your delivery date?\" \"Vaginal delivery or ?\"     c/s    Protocols used: POSTPARTUM - LEG SWELLING AND EDEMA-ADULT-AH      "

## 2023-04-29 LAB
ALBUMIN SERPL-MCNC: 3.6 G/DL (ref 3.5–5.2)
ALBUMIN/GLOB SERPL: 1.3 G/DL
ALP SERPL-CCNC: 128 U/L (ref 39–117)
ALT SERPL W P-5'-P-CCNC: 23 U/L (ref 1–33)
ANION GAP SERPL CALCULATED.3IONS-SCNC: 10 MMOL/L (ref 5–15)
AST SERPL-CCNC: 35 U/L (ref 1–32)
BILIRUB SERPL-MCNC: 0.7 MG/DL (ref 0–1.2)
BUN SERPL-MCNC: 16 MG/DL (ref 6–20)
BUN/CREAT SERPL: 28.6 (ref 7–25)
CALCIUM SPEC-SCNC: 9 MG/DL (ref 8.6–10.5)
CHLORIDE SERPL-SCNC: 104 MMOL/L (ref 98–107)
CO2 SERPL-SCNC: 25 MMOL/L (ref 22–29)
CREAT SERPL-MCNC: 0.56 MG/DL (ref 0.57–1)
CREAT UR-MCNC: 34.6 MG/DL
EGFRCR SERPLBLD CKD-EPI 2021: 126.9 ML/MIN/1.73
GLOBULIN UR ELPH-MCNC: 2.8 GM/DL
GLUCOSE SERPL-MCNC: 88 MG/DL (ref 65–99)
LDH SERPL-CCNC: 289 U/L (ref 135–214)
POTASSIUM SERPL-SCNC: 3.8 MMOL/L (ref 3.5–5.2)
PROT ?TM UR-MCNC: 6 MG/DL
PROT SERPL-MCNC: 6.4 G/DL (ref 6–8.5)
SODIUM SERPL-SCNC: 139 MMOL/L (ref 136–145)
URATE SERPL-MCNC: 5.2 MG/DL (ref 2.4–5.7)

## (undated) DEVICE — ADHS SKIN PREMIERPRO EXOFIN TOPICAL HI/VISC .5ML

## (undated) DEVICE — TRAP FLD MINIVAC MEGADYNE 100ML

## (undated) DEVICE — TBG PENCL TELESCP MEGADYNE SMOKE EVAC 10FT

## (undated) DEVICE — APPL CHLORAPREP TINTED 26ML TEAL

## (undated) DEVICE — EXTRA LARGE, DISPOSABLE C-SECTION PROTECTOR - RETRACTOR: Brand: ALEXIS ® O C-SECTION PROTECTOR - RETRACTOR

## (undated) DEVICE — 4-PORT MANIFOLD: Brand: NEPTUNE 2

## (undated) DEVICE — PK C/SECT 10

## (undated) DEVICE — MAT PREVALON MOBL TRANSFR AIR W/PAD REPROC 39X81IN

## (undated) DEVICE — PATIENT RETURN ELECTRODE, SINGLE-USE, CONTACT QUALITY MONITORING, ADULT, WITH 9FT CORD, FOR PATIENTS WEIGING OVER 33LBS. (15KG): Brand: MEGADYNE

## (undated) DEVICE — TRY SPINE BLCK WHITACRE 25G 3X5IN